# Patient Record
Sex: MALE | Race: ASIAN | NOT HISPANIC OR LATINO | Employment: OTHER
[De-identification: names, ages, dates, MRNs, and addresses within clinical notes are randomized per-mention and may not be internally consistent; named-entity substitution may affect disease eponyms.]

---

## 2017-02-22 ENCOUNTER — CHARTING TRANS (OUTPATIENT)
Dept: OTHER | Age: 67
End: 2017-02-22

## 2017-02-22 ENCOUNTER — LAB SERVICES (OUTPATIENT)
Dept: OTHER | Age: 67
End: 2017-02-22

## 2017-02-28 LAB
25(OH)D3+25(OH)D2 SERPL-MCNC: 38.1 NG/ML (ref 30–100)
ALBUMIN SERPL-MCNC: 3.7 G/DL (ref 3.6–5.1)
ALBUMIN/GLOB SERPL: 1.1 (ref 1–2.4)
ALP SERPL-CCNC: 85 UNITS/L (ref 45–117)
ALT SERPL-CCNC: 20 UNITS/L
ANA SER QL IA: NEGATIVE
ANION GAP SERPL CALC-SCNC: 15 MMOL/L (ref 10–20)
AST SERPL-CCNC: 13 UNITS/L
BASOPHILS # BLD: 0 K/MCL (ref 0–0.3)
BASOPHILS NFR BLD: 0 %
BILIRUB SERPL-MCNC: 0.9 MG/DL (ref 0.2–1)
BUN SERPL-MCNC: 19 MG/DL (ref 6–20)
BUN/CREAT SERPL: 17 (ref 7–25)
CALCIUM SERPL-MCNC: 9 MG/DL (ref 8.4–10.2)
CHLORIDE SERPL-SCNC: 106 MMOL/L (ref 98–107)
CHOLEST SERPL-MCNC: 176 MG/DL
CHOLEST/HDLC SERPL: 2.7
CO2 SERPL-SCNC: 25 MMOL/L (ref 21–32)
CREAT SERPL-MCNC: 1.15 MG/DL (ref 0.67–1.17)
CRP SERPL HS-MCNC: 2.91 MG/L
DIFFERENTIAL METHOD BLD: ABNORMAL
EOSINOPHIL # BLD: 0.2 K/MCL (ref 0.1–0.5)
EOSINOPHIL NFR BLD: 3 %
ERYTHROCYTE [DISTWIDTH] IN BLOOD: 14 % (ref 11–15)
ERYTHROCYTE [SEDIMENTATION RATE] IN BLOOD BY WESTERGREN METHOD: 10 MM/HR (ref 0–15)
FOLATE SERPL-MCNC: 11 NG/ML
GLOBULIN SER-MCNC: 3.4 G/DL (ref 2–4)
GLUCOSE SERPL-MCNC: 90 MG/DL (ref 65–99)
HBA1C MFR BLD: 5.9 % (ref 4.5–5.6)
HDLC SERPL-MCNC: 65 MG/DL
HEMATOCRIT: 40.1 % (ref 39–51)
HEMOGLOBIN: 12.9 G/DL (ref 13–17)
IRON SATN MFR SERPL: 21 % (ref 15–45)
IRON SERPL-MCNC: 72 MCG/DL (ref 65–175)
LDLC SERPL CALC-MCNC: 88 MG/DL
LENGTH OF FAST TIME PATIENT: ABNORMAL HRS
LENGTH OF FAST TIME PATIENT: ABNORMAL HRS
LYMPHOCYTES # BLD: 2.7 K/MCL (ref 1–4)
LYMPHOCYTES NFR BLD: 37 %
MEAN CORPUSCULAR HEMOGLOBIN: 24.6 PG (ref 26–34)
MEAN CORPUSCULAR HGB CONC: 32.2 G/DL (ref 32–36.5)
MEAN CORPUSCULAR VOLUME: 76.4 FL (ref 78–100)
MONOCYTES # BLD: 0.7 K/MCL (ref 0.3–0.9)
MONOCYTES NFR BLD: 10 %
NEUTROPHILS # BLD: 3.6 K/MCL (ref 1.8–7.7)
NEUTROPHILS NFR BLD: 50 %
NONHDLC SERPL-MCNC: 111 MG/DL
PLATELET COUNT: 181 K/MCL (ref 140–450)
POTASSIUM SERPL-SCNC: 4.1 MMOL/L (ref 3.4–5.1)
RED CELL COUNT: 5.25 MIL/MCL (ref 4.5–5.9)
RHEUMATOID FACT SER NEPH-ACNC: <10 UNITS/ML
SODIUM SERPL-SCNC: 142 MMOL/L (ref 135–145)
T3FREE SERPL-MCNC: 3.5 PG/ML (ref 2.2–4)
T4 FREE SERPL-MCNC: 1.2 NG/DL (ref 0.8–1.5)
TIBC SERPL-MCNC: 344 MCG/DL (ref 250–450)
TOTAL PROTEIN: 7.1 G/DL (ref 6.4–8.2)
TRIGL SERPL-MCNC: 116 MG/DL
TSH SERPL-ACNC: 1.29 MCUNITS/ML (ref 0.35–5)
VIT B12 SERPL-MCNC: 406 PG/ML (ref 211–911)
WHITE BLOOD COUNT: 7.3 K/MCL (ref 4.2–11)

## 2017-03-08 ENCOUNTER — CHARTING TRANS (OUTPATIENT)
Dept: OTHER | Age: 67
End: 2017-03-08

## 2017-04-25 ENCOUNTER — CHARTING TRANS (OUTPATIENT)
Dept: OTHER | Age: 67
End: 2017-04-25

## 2017-05-08 ENCOUNTER — HOSPITAL (OUTPATIENT)
Dept: OTHER | Age: 67
End: 2017-05-08
Attending: ORTHOPAEDIC SURGERY

## 2017-11-01 ENCOUNTER — LAB SERVICES (OUTPATIENT)
Dept: OTHER | Age: 67
End: 2017-11-01

## 2017-11-02 LAB
25(OH)D3+25(OH)D2 SERPL-MCNC: 35.1 NG/ML (ref 30–100)
BASOPHILS # BLD: 0 K/MCL (ref 0–0.3)
BASOPHILS NFR BLD: 1 %
CHOLEST SERPL-MCNC: 182 MG/DL
CHOLEST/HDLC SERPL: 3.4
CRP SERPL HS-MCNC: 2.63 MG/L
DIFFERENTIAL METHOD BLD: ABNORMAL
EOSINOPHIL # BLD: 0.2 K/MCL (ref 0.1–0.5)
EOSINOPHIL NFR BLD: 3 %
ERYTHROCYTE [DISTWIDTH] IN BLOOD: 13.3 % (ref 11–15)
HBA1C MFR BLD: 5.7 % (ref 4.5–5.6)
HDLC SERPL-MCNC: 54 MG/DL
HEMATOCRIT: 41.2 % (ref 39–51)
HEMOGLOBIN: 13 G/DL (ref 13–17)
IRON SATN MFR SERPL: 27 % (ref 15–45)
IRON SERPL-MCNC: 81 MCG/DL (ref 65–175)
LDLC SERPL CALC-MCNC: 102 MG/DL
LENGTH OF FAST TIME PATIENT: NORMAL HRS
LYMPHOCYTES # BLD: 1.9 K/MCL (ref 1–4)
LYMPHOCYTES NFR BLD: 36 %
MEAN CORPUSCULAR HEMOGLOBIN: 25.1 PG (ref 26–34)
MEAN CORPUSCULAR HGB CONC: 31.6 G/DL (ref 32–36.5)
MEAN CORPUSCULAR VOLUME: 79.7 FL (ref 78–100)
MONOCYTES # BLD: 0.6 K/MCL (ref 0.3–0.9)
MONOCYTES NFR BLD: 11 %
NEUTROPHILS # BLD: 2.7 K/MCL (ref 1.8–7.7)
NEUTROPHILS NFR BLD: 49 %
NONHDLC SERPL-MCNC: 128 MG/DL
PLATELET COUNT: 206 K/MCL (ref 140–450)
RED CELL COUNT: 5.17 MIL/MCL (ref 4.5–5.9)
TIBC SERPL-MCNC: 299 MCG/DL (ref 250–450)
TRIGL SERPL-MCNC: 128 MG/DL
TSH SERPL-ACNC: 1.33 MCUNITS/ML (ref 0.35–5)
VIT B12 SERPL-MCNC: 308 PG/ML (ref 211–911)
WHITE BLOOD COUNT: 5.4 K/MCL (ref 4.2–11)

## 2017-11-04 LAB — MAGNESIUM RBC-SCNC: 4.6

## 2017-11-15 ENCOUNTER — CHARTING TRANS (OUTPATIENT)
Dept: OTHER | Age: 67
End: 2017-11-15

## 2017-12-26 ENCOUNTER — HOSPITAL (OUTPATIENT)
Dept: OTHER | Age: 67
End: 2017-12-26
Attending: INTERNAL MEDICINE

## 2018-02-26 ENCOUNTER — PRIOR ORIGINAL RECORDS (OUTPATIENT)
Dept: OTHER | Age: 68
End: 2018-02-26

## 2018-02-26 ENCOUNTER — LAB SERVICES (OUTPATIENT)
Dept: OTHER | Age: 68
End: 2018-02-26

## 2018-02-27 LAB
25(OH)D3+25(OH)D2 SERPL-MCNC: 45.9 NG/ML (ref 30–100)
BASOPHILS # BLD: 0 K/MCL (ref 0–0.3)
BASOPHILS NFR BLD: 0 %
CHOLEST SERPL-MCNC: 187 MG/DL
CHOLEST/HDLC SERPL: 3.2
DIFFERENTIAL METHOD BLD: ABNORMAL
EOSINOPHIL # BLD: 0.2 K/MCL (ref 0.1–0.5)
EOSINOPHIL NFR BLD: 2 %
ERYTHROCYTE [DISTWIDTH] IN BLOOD: 14.1 % (ref 11–15)
ERYTHROCYTE [SEDIMENTATION RATE] IN BLOOD BY WESTERGREN METHOD: 10 MM/HR (ref 0–20)
FOLATE SERPL-MCNC: 7 NG/ML
HBA1C MFR BLD: 5.8 % (ref 4.5–5.6)
HDLC SERPL-MCNC: 58 MG/DL
HEMATOCRIT: 41.1 % (ref 39–51)
HEMOGLOBIN: 13.1 G/DL (ref 13–17)
LDLC SERPL CALC-MCNC: 106 MG/DL
LENGTH OF FAST TIME PATIENT: NORMAL HRS
LYMPHOCYTES # BLD: 2.3 K/MCL (ref 1–4)
LYMPHOCYTES NFR BLD: 33 %
MAGNESIUM RBC-SCNC: NORMAL
MEAN CORPUSCULAR HEMOGLOBIN: 24.8 PG (ref 26–34)
MEAN CORPUSCULAR HGB CONC: 31.9 G/DL (ref 32–36.5)
MEAN CORPUSCULAR VOLUME: 77.7 FL (ref 78–100)
MONOCYTES # BLD: 0.6 K/MCL (ref 0.3–0.9)
MONOCYTES NFR BLD: 9 %
NEUTROPHILS # BLD: 3.9 K/MCL (ref 1.8–7.7)
NEUTROPHILS NFR BLD: 56 %
NONHDLC SERPL-MCNC: 129 MG/DL
PLATELET COUNT: 199 K/MCL (ref 140–450)
RED CELL COUNT: 5.29 MIL/MCL (ref 4.5–5.9)
TRIGL SERPL-MCNC: 116 MG/DL
VIT B12 SERPL-MCNC: 379 PG/ML (ref 211–911)
WHITE BLOOD COUNT: 7 K/MCL (ref 4.2–11)

## 2018-02-28 LAB
CREATININE RANDOM URINE: 54 MG/DL
MICROALBUMIN UR-MCNC: <0.5 MG/DL
MICROALBUMIN/CREAT UR: NORMAL MCG/MG

## 2018-03-15 ENCOUNTER — CHARTING TRANS (OUTPATIENT)
Dept: OTHER | Age: 68
End: 2018-03-15

## 2018-03-17 LAB
Lab: NORMAL
REF LAB ORDER CODE: 623
RESULT: 4.1
TEST NAME: NORMAL

## 2018-03-30 ENCOUNTER — HOSPITAL (OUTPATIENT)
Dept: OTHER | Age: 68
End: 2018-03-30
Attending: FAMILY MEDICINE

## 2018-04-03 ENCOUNTER — HOSPITAL (OUTPATIENT)
Dept: OTHER | Age: 68
End: 2018-04-03
Attending: FAMILY MEDICINE

## 2018-05-02 ENCOUNTER — HOSPITAL (OUTPATIENT)
Dept: OTHER | Age: 68
End: 2018-05-02
Attending: FAMILY MEDICINE

## 2018-09-24 ENCOUNTER — CHARTING TRANS (OUTPATIENT)
Dept: OTHER | Age: 68
End: 2018-09-24

## 2018-09-24 ENCOUNTER — MYAURORA ACCOUNT LINK (OUTPATIENT)
Dept: OTHER | Age: 68
End: 2018-09-24

## 2018-09-27 ENCOUNTER — LAB SERVICES (OUTPATIENT)
Dept: OTHER | Age: 68
End: 2018-09-27

## 2018-10-02 LAB — HEMOCCULT STL QL: NEGATIVE

## 2018-10-11 ENCOUNTER — PRIOR ORIGINAL RECORDS (OUTPATIENT)
Dept: OTHER | Age: 68
End: 2018-10-11

## 2018-10-11 ENCOUNTER — MYAURORA ACCOUNT LINK (OUTPATIENT)
Dept: OTHER | Age: 68
End: 2018-10-11

## 2018-10-17 ENCOUNTER — CHARTING TRANS (OUTPATIENT)
Dept: OTHER | Age: 68
End: 2018-10-17

## 2018-10-17 LAB
CHOLESTEROL, TOTAL: 187 MG/DL
HDL CHOLESTEROL: 58 MG/DL
LDL CHOLESTEROL: 106 MG/DL
TRIGLYCERIDES: 116 MG/DL

## 2018-10-21 ENCOUNTER — MYAURORA ACCOUNT LINK (OUTPATIENT)
Dept: OTHER | Age: 68
End: 2018-10-21

## 2018-10-22 ENCOUNTER — PRIOR ORIGINAL RECORDS (OUTPATIENT)
Dept: OTHER | Age: 68
End: 2018-10-22

## 2018-11-01 VITALS
DIASTOLIC BLOOD PRESSURE: 80 MMHG | BODY MASS INDEX: 39.66 KG/M2 | WEIGHT: 246.8 LBS | TEMPERATURE: 97.8 F | HEART RATE: 62 BPM | RESPIRATION RATE: 18 BRPM | HEIGHT: 66 IN | SYSTOLIC BLOOD PRESSURE: 124 MMHG

## 2018-11-02 VITALS
DIASTOLIC BLOOD PRESSURE: 82 MMHG | BODY MASS INDEX: 39.7 KG/M2 | HEART RATE: 58 BPM | WEIGHT: 247 LBS | SYSTOLIC BLOOD PRESSURE: 128 MMHG | HEIGHT: 66 IN

## 2018-11-05 VITALS
HEIGHT: 66 IN | DIASTOLIC BLOOD PRESSURE: 80 MMHG | BODY MASS INDEX: 38.25 KG/M2 | SYSTOLIC BLOOD PRESSURE: 126 MMHG | WEIGHT: 238 LBS

## 2018-11-21 ENCOUNTER — PRIOR ORIGINAL RECORDS (OUTPATIENT)
Dept: OTHER | Age: 68
End: 2018-11-21

## 2018-11-27 VITALS
HEART RATE: 68 BPM | RESPIRATION RATE: 18 BRPM | BODY MASS INDEX: 39.89 KG/M2 | WEIGHT: 248.2 LBS | DIASTOLIC BLOOD PRESSURE: 80 MMHG | HEIGHT: 66 IN | SYSTOLIC BLOOD PRESSURE: 126 MMHG | TEMPERATURE: 98.2 F

## 2019-01-16 ENCOUNTER — TELEPHONE (OUTPATIENT)
Dept: INTERNAL MEDICINE | Age: 69
End: 2019-01-16

## 2019-01-16 DIAGNOSIS — E53.8 B12 DEFICIENCY: ICD-10-CM

## 2019-01-16 DIAGNOSIS — Z00.00 WELL ADULT EXAM: Primary | ICD-10-CM

## 2019-01-16 DIAGNOSIS — E61.2 MAGNESIUM DEFICIENCY: ICD-10-CM

## 2019-01-16 DIAGNOSIS — E78.00 ELEVATED LDL CHOLESTEROL LEVEL: ICD-10-CM

## 2019-01-16 DIAGNOSIS — R73.09 ELEVATED GLUCOSE: ICD-10-CM

## 2019-01-16 DIAGNOSIS — E55.9 VITAMIN D DEFICIENCY: ICD-10-CM

## 2019-02-14 RX ORDER — ERGOCALCIFEROL 1.25 MG/1
CAPSULE ORAL
Qty: 4 CAPSULE | Refills: 4 | Status: SHIPPED | OUTPATIENT
Start: 2019-02-14 | End: 2020-11-20 | Stop reason: ALTCHOICE

## 2019-02-28 VITALS
SYSTOLIC BLOOD PRESSURE: 122 MMHG | HEART RATE: 70 BPM | DIASTOLIC BLOOD PRESSURE: 70 MMHG | BODY MASS INDEX: 39.53 KG/M2 | WEIGHT: 246 LBS | HEIGHT: 66 IN | OXYGEN SATURATION: 97 %

## 2019-03-07 ENCOUNTER — TELEPHONE (OUTPATIENT)
Dept: FAMILY MEDICINE | Age: 69
End: 2019-03-07

## 2019-03-08 RX ORDER — PAROXETINE 10 MG/1
TABLET, FILM COATED ORAL
Qty: 30 TABLET | Refills: 1 | Status: SHIPPED | OUTPATIENT
Start: 2019-03-08 | End: 2020-10-19 | Stop reason: CLARIF

## 2019-03-11 RX ORDER — PAROXETINE 10 MG/1
TABLET, FILM COATED ORAL
Qty: 90 TABLET | Refills: 0 | OUTPATIENT
Start: 2019-03-11

## 2019-03-12 ENCOUNTER — TELEPHONE (OUTPATIENT)
Dept: FAMILY MEDICINE | Age: 69
End: 2019-03-12

## 2019-03-13 RX ORDER — PAROXETINE 10 MG/1
10 TABLET, FILM COATED ORAL EVERY MORNING
Qty: 30 TABLET | Refills: 3 | Status: SHIPPED | OUTPATIENT
Start: 2019-03-13 | End: 2020-10-19 | Stop reason: CLARIF

## 2019-04-15 ENCOUNTER — TELEPHONE (OUTPATIENT)
Dept: FAMILY MEDICINE | Age: 69
End: 2019-04-15

## 2019-04-18 RX ORDER — PAROXETINE 10 MG/1
10 TABLET, FILM COATED ORAL EVERY MORNING
Qty: 30 TABLET | Refills: 3 | Status: SHIPPED | OUTPATIENT
Start: 2019-04-18 | End: 2020-10-19 | Stop reason: CLARIF

## 2020-10-15 ENCOUNTER — TELEPHONE (OUTPATIENT)
Dept: SCHEDULING | Age: 70
End: 2020-10-15

## 2020-10-19 ENCOUNTER — OFFICE VISIT (OUTPATIENT)
Dept: FAMILY MEDICINE | Age: 70
End: 2020-10-19

## 2020-10-19 VITALS
HEART RATE: 68 BPM | BODY MASS INDEX: 41.25 KG/M2 | DIASTOLIC BLOOD PRESSURE: 69 MMHG | SYSTOLIC BLOOD PRESSURE: 113 MMHG | WEIGHT: 255.6 LBS

## 2020-10-19 DIAGNOSIS — Z00.00 HEALTHCARE MAINTENANCE: Primary | ICD-10-CM

## 2020-10-19 DIAGNOSIS — R73.03 PREDIABETES: ICD-10-CM

## 2020-10-19 DIAGNOSIS — M19.90 ARTHRITIS: ICD-10-CM

## 2020-10-19 DIAGNOSIS — F41.9 ANXIETY: ICD-10-CM

## 2020-10-19 PROBLEM — E11.9 DIABETES MELLITUS (CMD): Status: ACTIVE | Noted: 2017-03-08

## 2020-10-19 PROBLEM — E11.9 DIABETES MELLITUS (CMD): Status: RESOLVED | Noted: 2017-03-08 | Resolved: 2020-10-19

## 2020-10-19 PROCEDURE — 90662 IIV NO PRSV INCREASED AG IM: CPT | Performed by: FAMILY MEDICINE

## 2020-10-19 PROCEDURE — 90732 PPSV23 VACC 2 YRS+ SUBQ/IM: CPT | Performed by: FAMILY MEDICINE

## 2020-10-19 PROCEDURE — 99397 PER PM REEVAL EST PAT 65+ YR: CPT | Performed by: FAMILY MEDICINE

## 2020-10-19 RX ORDER — ESCITALOPRAM OXALATE 10 MG/1
TABLET ORAL
COMMUNITY
End: 2020-10-19 | Stop reason: ALTCHOICE

## 2020-10-19 RX ORDER — NABUMETONE 750 MG/1
750 TABLET, FILM COATED ORAL
COMMUNITY
Start: 2018-10-11 | End: 2020-10-19 | Stop reason: SDUPTHER

## 2020-10-19 RX ORDER — ESCITALOPRAM OXALATE 20 MG/1
20 TABLET ORAL DAILY
Qty: 90 TABLET | Refills: 3 | Status: SHIPPED | OUTPATIENT
Start: 2020-10-19 | End: 2022-11-04 | Stop reason: SDUPTHER

## 2020-10-19 RX ORDER — NABUMETONE 750 MG/1
750 TABLET, FILM COATED ORAL 2 TIMES DAILY
Qty: 180 TABLET | Refills: 3 | Status: SHIPPED | OUTPATIENT
Start: 2020-10-19 | End: 2021-11-29 | Stop reason: SDUPTHER

## 2020-10-19 ASSESSMENT — ENCOUNTER SYMPTOMS
UNEXPECTED WEIGHT CHANGE: 0
BACK PAIN: 1
APPETITE CHANGE: 0
AGITATION: 0
FATIGUE: 0
ABDOMINAL DISTENTION: 0
FEVER: 0
ACTIVITY CHANGE: 0
DIZZINESS: 0
CHILLS: 0
SHORTNESS OF BREATH: 0
RHINORRHEA: 0
CONSTIPATION: 0
NERVOUS/ANXIOUS: 0
COLOR CHANGE: 0
VOMITING: 0
LIGHT-HEADEDNESS: 0
NUMBNESS: 0
HEADACHES: 0
DIARRHEA: 0
NAUSEA: 0
WEAKNESS: 0
ABDOMINAL PAIN: 0

## 2020-10-19 ASSESSMENT — PATIENT HEALTH QUESTIONNAIRE - PHQ9
CLINICAL INTERPRETATION OF PHQ2 SCORE: NO FURTHER SCREENING NEEDED
SUM OF ALL RESPONSES TO PHQ9 QUESTIONS 1 AND 2: 0
SUM OF ALL RESPONSES TO PHQ9 QUESTIONS 1 AND 2: 0
2. FEELING DOWN, DEPRESSED OR HOPELESS: NOT AT ALL
CLINICAL INTERPRETATION OF PHQ9 SCORE: NO FURTHER SCREENING NEEDED
1. LITTLE INTEREST OR PLEASURE IN DOING THINGS: NOT AT ALL

## 2020-10-29 ENCOUNTER — LAB SERVICES (OUTPATIENT)
Dept: LAB | Age: 70
End: 2020-10-29

## 2020-10-29 DIAGNOSIS — Z00.00 HEALTHCARE MAINTENANCE: ICD-10-CM

## 2020-10-29 LAB
ALBUMIN SERPL-MCNC: 3.7 G/DL (ref 3.6–5.1)
ALBUMIN/GLOB SERPL: 0.9 {RATIO} (ref 1–2.4)
ALP SERPL-CCNC: 85 UNITS/L (ref 45–117)
ALT SERPL-CCNC: 21 UNITS/L
ANION GAP SERPL CALC-SCNC: 9 MMOL/L (ref 10–20)
AST SERPL-CCNC: 19 UNITS/L
BILIRUB SERPL-MCNC: 0.9 MG/DL (ref 0.2–1)
BUN SERPL-MCNC: 23 MG/DL (ref 6–20)
BUN/CREAT SERPL: 19 (ref 7–25)
CALCIUM SERPL-MCNC: 9.2 MG/DL (ref 8.4–10.2)
CHLORIDE SERPL-SCNC: 107 MMOL/L (ref 98–107)
CHOLEST SERPL-MCNC: 195 MG/DL
CHOLEST/HDLC SERPL: 3.3 {RATIO}
CO2 SERPL-SCNC: 27 MMOL/L (ref 21–32)
CREAT SERPL-MCNC: 1.23 MG/DL (ref 0.67–1.17)
GLOBULIN SER-MCNC: 4.1 G/DL (ref 2–4)
GLUCOSE SERPL-MCNC: 104 MG/DL (ref 65–99)
HBA1C MFR BLD: 6 % (ref 4.5–5.6)
HDLC SERPL-MCNC: 59 MG/DL
LDLC SERPL CALC-MCNC: 116 MG/DL
LENGTH OF FAST TIME PATIENT: 12 HRS
LENGTH OF FAST TIME PATIENT: 12 HRS
NONHDLC SERPL-MCNC: 136 MG/DL
POTASSIUM SERPL-SCNC: 4.3 MMOL/L (ref 3.4–5.1)
PROT SERPL-MCNC: 7.8 G/DL (ref 6.4–8.2)
SODIUM SERPL-SCNC: 139 MMOL/L (ref 135–145)
TRIGL SERPL-MCNC: 99 MG/DL

## 2020-10-29 PROCEDURE — 80053 COMPREHEN METABOLIC PANEL: CPT | Performed by: FAMILY MEDICINE

## 2020-10-29 PROCEDURE — 83036 HEMOGLOBIN GLYCOSYLATED A1C: CPT | Performed by: FAMILY MEDICINE

## 2020-10-29 PROCEDURE — 80061 LIPID PANEL: CPT | Performed by: FAMILY MEDICINE

## 2020-10-29 PROCEDURE — 36415 COLL VENOUS BLD VENIPUNCTURE: CPT | Performed by: FAMILY MEDICINE

## 2020-10-29 PROCEDURE — 85025 COMPLETE CBC W/AUTO DIFF WBC: CPT | Performed by: FAMILY MEDICINE

## 2020-10-30 LAB
BASOPHILS # BLD: 0.1 K/MCL (ref 0–0.3)
BASOPHILS NFR BLD: 1 %
DIFFERENTIAL METHOD BLD: ABNORMAL
EOSINOPHIL # BLD: 0.2 K/MCL (ref 0.1–0.5)
EOSINOPHIL NFR BLD: 2 %
ERYTHROCYTE [DISTWIDTH] IN BLOOD: 14 % (ref 11–15)
HCT VFR BLD CALC: 43.6 % (ref 39–51)
HGB BLD-MCNC: 13 G/DL (ref 13–17)
IMM GRANULOCYTES # BLD AUTO: 0 K/MCL (ref 0–0.2)
IMM GRANULOCYTES NFR BLD: 0 %
LYMPHOCYTES # BLD: 2.8 K/MCL (ref 1–4)
LYMPHOCYTES NFR BLD: 37 %
MCH RBC QN AUTO: 23.7 PG (ref 26–34)
MCHC RBC AUTO-ENTMCNC: 29.8 G/DL (ref 32–36.5)
MCV RBC AUTO: 79.4 FL (ref 78–100)
MONOCYTES # BLD: 0.7 K/MCL (ref 0.3–0.9)
MONOCYTES NFR BLD: 9 %
NEUTROPHILS # BLD: 3.8 K/MCL (ref 1.8–7.7)
NEUTROPHILS NFR BLD: 51 %
NRBC BLD MANUAL-RTO: 0 /100 WBC
PLATELET # BLD: 228 K/MCL (ref 140–450)
RBC # BLD: 5.49 MIL/MCL (ref 4.5–5.9)
WBC # BLD: 7.6 K/MCL (ref 4.2–11)

## 2020-11-10 ENCOUNTER — E-ADVICE (OUTPATIENT)
Dept: FAMILY MEDICINE | Age: 70
End: 2020-11-10

## 2020-11-10 ENCOUNTER — TELEPHONE (OUTPATIENT)
Dept: SCHEDULING | Age: 70
End: 2020-11-10

## 2020-11-19 ENCOUNTER — V-VISIT (OUTPATIENT)
Dept: FAMILY MEDICINE | Age: 70
End: 2020-11-19

## 2020-11-19 DIAGNOSIS — K14.0 TONGUE ULCER: ICD-10-CM

## 2020-11-19 DIAGNOSIS — E78.5 HYPERLIPIDEMIA, UNSPECIFIED HYPERLIPIDEMIA TYPE: ICD-10-CM

## 2020-11-19 DIAGNOSIS — M19.90 ARTHRITIS: ICD-10-CM

## 2020-11-19 DIAGNOSIS — F41.9 ANXIETY: Primary | ICD-10-CM

## 2020-11-19 PROCEDURE — 96127 BRIEF EMOTIONAL/BEHAV ASSMT: CPT | Performed by: FAMILY MEDICINE

## 2020-11-19 PROCEDURE — 99213 OFFICE O/P EST LOW 20 MIN: CPT | Performed by: FAMILY MEDICINE

## 2020-11-19 RX ORDER — DEXAMETHASONE 0.5 MG/5ML
0.5 ELIXIR ORAL
Qty: 237 ML | Refills: 0 | Status: SHIPPED | OUTPATIENT
Start: 2020-11-19 | End: 2021-05-20 | Stop reason: ALTCHOICE

## 2020-11-19 RX ORDER — ATORVASTATIN CALCIUM 40 MG/1
40 TABLET, FILM COATED ORAL DAILY
Qty: 90 TABLET | Refills: 3 | Status: SHIPPED | OUTPATIENT
Start: 2020-11-19 | End: 2022-11-04 | Stop reason: SDUPTHER

## 2020-11-19 RX ORDER — LIDOCAINE 50 MG/G
OINTMENT TOPICAL PRN
Qty: 50 G | Refills: 0 | Status: SHIPPED | OUTPATIENT
Start: 2020-11-19 | End: 2021-11-29 | Stop reason: SDUPTHER

## 2020-11-19 SDOH — HEALTH STABILITY: MENTAL HEALTH: HOW OFTEN DO YOU HAVE A DRINK CONTAINING ALCOHOL?: NEVER

## 2020-11-19 ASSESSMENT — ANXIETY QUESTIONNAIRES
IF YOU CHECKED OFF ANY PROBLEMS ON THIS QUESTIONNAIRE, HOW DIFFICULT HAVE THESE PROBLEMS MADE IT FOR YOU TO DO YOUR WORK, TAKE CARE OF THINGS AT HOME, OR GET ALONG WITH OTHER PEOPLE: NOT DIFFICULT AT ALL
6. BECOMING EASILY ANNOYED OR IRRITABLE: 1
GAD7 TOTAL SCORE: 2
7. FEELING AFRAID AS IF SOMETHING AWFUL MIGHT HAPPEN: 0
2. NOT BEING ABLE TO STOP OR CONTROL WORRYING: SEVERAL DAYS
4. TROUBLE RELAXING: NOT AT ALL
6. BECOMING EASILY ANNOYED OR IRRITABLE: SEVERAL DAYS
3. WORRYING TOO MUCH ABOUT DIFFERENT THINGS: NOT AT ALL
7. FEELING AFRAID AS IF SOMETHING AWFUL MIGHT HAPPEN: NOT AT ALL
1. FEELING NERVOUS, ANXIOUS, OR ON EDGE: 0
5. BEING SO RESTLESS THAT IT IS HARD TO SIT STILL: NOT AT ALL
3. WORRYING TOO MUCH ABOUT DIFFERENT THINGS: 0
2. NOT BEING ABLE TO STOP OR CONTROL WORRYING: 1
4. TROUBLE RELAXING: 0
1. FEELING NERVOUS, ANXIOUS, OR ON EDGE: NOT AT ALL
5. BEING SO RESTLESS THAT IT IS HARD TO SIT STILL: 0

## 2020-11-19 ASSESSMENT — PATIENT HEALTH QUESTIONNAIRE - PHQ9
SUM OF ALL RESPONSES TO PHQ9 QUESTIONS 1 AND 2: 0
CLINICAL INTERPRETATION OF PHQ2 SCORE: NO FURTHER SCREENING NEEDED
1. LITTLE INTEREST OR PLEASURE IN DOING THINGS: NOT AT ALL
SUM OF ALL RESPONSES TO PHQ9 QUESTIONS 1 AND 2: 0
CLINICAL INTERPRETATION OF PHQ9 SCORE: NO FURTHER SCREENING NEEDED
2. FEELING DOWN, DEPRESSED OR HOPELESS: NOT AT ALL

## 2020-11-20 PROBLEM — K14.0 TONGUE ULCER: Status: ACTIVE | Noted: 2020-11-20

## 2020-11-20 PROBLEM — E78.5 HYPERLIPIDEMIA: Status: ACTIVE | Noted: 2020-11-20

## 2020-11-20 ASSESSMENT — ENCOUNTER SYMPTOMS
DIZZINESS: 0
AGITATION: 0
COLOR CHANGE: 0
NERVOUS/ANXIOUS: 0
ABDOMINAL DISTENTION: 0
CONSTIPATION: 0
APPETITE CHANGE: 0
LIGHT-HEADEDNESS: 0
NAUSEA: 0
FATIGUE: 0
ACTIVITY CHANGE: 0
HEADACHES: 0
VOMITING: 0
SHORTNESS OF BREATH: 0
DIARRHEA: 0
UNEXPECTED WEIGHT CHANGE: 0
FEVER: 0
ABDOMINAL PAIN: 0
RHINORRHEA: 0
CHILLS: 0

## 2021-01-11 ENCOUNTER — E-ADVICE (OUTPATIENT)
Dept: FAMILY MEDICINE | Age: 71
End: 2021-01-11

## 2021-01-11 ENCOUNTER — APPOINTMENT (OUTPATIENT)
Dept: FAMILY MEDICINE | Age: 71
End: 2021-01-11

## 2021-02-16 ENCOUNTER — LAB SERVICES (OUTPATIENT)
Dept: LAB | Age: 71
End: 2021-02-16

## 2021-02-16 ENCOUNTER — OFFICE VISIT (OUTPATIENT)
Dept: FAMILY MEDICINE | Age: 71
End: 2021-02-16

## 2021-02-16 DIAGNOSIS — R73.03 PREDIABETES: ICD-10-CM

## 2021-02-16 DIAGNOSIS — M25.561 CHRONIC PAIN OF BOTH KNEES: ICD-10-CM

## 2021-02-16 DIAGNOSIS — M48.061 SPINAL STENOSIS OF LUMBAR REGION AT MULTIPLE LEVELS: ICD-10-CM

## 2021-02-16 DIAGNOSIS — R35.0 BENIGN PROSTATIC HYPERPLASIA WITH URINARY FREQUENCY: ICD-10-CM

## 2021-02-16 DIAGNOSIS — E66.01 MORBID OBESITY WITH BMI OF 40.0-44.9, ADULT (CMD): ICD-10-CM

## 2021-02-16 DIAGNOSIS — G89.29 CHRONIC PAIN OF BOTH KNEES: ICD-10-CM

## 2021-02-16 DIAGNOSIS — E78.5 HYPERLIPIDEMIA, UNSPECIFIED HYPERLIPIDEMIA TYPE: ICD-10-CM

## 2021-02-16 DIAGNOSIS — M19.90 ARTHRITIS: ICD-10-CM

## 2021-02-16 DIAGNOSIS — M25.562 CHRONIC PAIN OF BOTH KNEES: ICD-10-CM

## 2021-02-16 DIAGNOSIS — R73.03 PREDIABETES: Primary | ICD-10-CM

## 2021-02-16 DIAGNOSIS — N40.1 BENIGN PROSTATIC HYPERPLASIA WITH URINARY FREQUENCY: ICD-10-CM

## 2021-02-16 LAB
ALBUMIN SERPL-MCNC: 3.5 G/DL (ref 3.6–5.1)
ALBUMIN/GLOB SERPL: 0.9 {RATIO} (ref 1–2.4)
ALP SERPL-CCNC: 88 UNITS/L (ref 45–117)
ALT SERPL-CCNC: 26 UNITS/L
ANION GAP SERPL CALC-SCNC: 9 MMOL/L (ref 10–20)
AST SERPL-CCNC: 19 UNITS/L
BILIRUB SERPL-MCNC: 0.7 MG/DL (ref 0.2–1)
BUN SERPL-MCNC: 19 MG/DL (ref 6–20)
BUN/CREAT SERPL: 17 (ref 7–25)
CALCIUM SERPL-MCNC: 9.3 MG/DL (ref 8.4–10.2)
CHLORIDE SERPL-SCNC: 106 MMOL/L (ref 98–107)
CHOLEST SERPL-MCNC: 158 MG/DL
CHOLEST/HDLC SERPL: 2.3 {RATIO}
CO2 SERPL-SCNC: 28 MMOL/L (ref 21–32)
CREAT SERPL-MCNC: 1.15 MG/DL (ref 0.67–1.17)
FASTING DURATION TIME PATIENT: ABNORMAL H
FASTING DURATION TIME PATIENT: NORMAL H
GFR SERPLBLD BASED ON 1.73 SQ M-ARVRAT: 65 ML/MIN/1.73M2
GLOBULIN SER-MCNC: 4 G/DL (ref 2–4)
GLUCOSE SERPL-MCNC: 92 MG/DL (ref 65–99)
HBA1C MFR BLD: 5.9 % (ref 4.5–5.6)
HDLC SERPL-MCNC: 68 MG/DL
LDLC SERPL CALC-MCNC: 78 MG/DL
NONHDLC SERPL-MCNC: 90 MG/DL
POTASSIUM SERPL-SCNC: 4.4 MMOL/L (ref 3.4–5.1)
PROT SERPL-MCNC: 7.5 G/DL (ref 6.4–8.2)
SODIUM SERPL-SCNC: 139 MMOL/L (ref 135–145)
TRIGL SERPL-MCNC: 60 MG/DL

## 2021-02-16 PROCEDURE — 99214 OFFICE O/P EST MOD 30 MIN: CPT | Performed by: FAMILY MEDICINE

## 2021-02-16 PROCEDURE — 36415 COLL VENOUS BLD VENIPUNCTURE: CPT | Performed by: FAMILY MEDICINE

## 2021-02-16 PROCEDURE — 80061 LIPID PANEL: CPT | Performed by: FAMILY MEDICINE

## 2021-02-16 PROCEDURE — 80053 COMPREHEN METABOLIC PANEL: CPT | Performed by: FAMILY MEDICINE

## 2021-02-16 PROCEDURE — 83036 HEMOGLOBIN GLYCOSYLATED A1C: CPT | Performed by: FAMILY MEDICINE

## 2021-02-16 RX ORDER — LANOLIN ALCOHOL/MO/W.PET/CERES
400 CREAM (GRAM) TOPICAL DAILY
COMMUNITY
End: 2023-05-09 | Stop reason: ALTCHOICE

## 2021-02-16 SDOH — HEALTH STABILITY: MENTAL HEALTH: HOW OFTEN DO YOU HAVE A DRINK CONTAINING ALCOHOL?: NEVER

## 2021-02-16 ASSESSMENT — PATIENT HEALTH QUESTIONNAIRE - PHQ9
2. FEELING DOWN, DEPRESSED OR HOPELESS: NOT AT ALL
SUM OF ALL RESPONSES TO PHQ9 QUESTIONS 1 AND 2: 0
1. LITTLE INTEREST OR PLEASURE IN DOING THINGS: NOT AT ALL
CLINICAL INTERPRETATION OF PHQ9 SCORE: NO FURTHER SCREENING NEEDED
CLINICAL INTERPRETATION OF PHQ2 SCORE: NO FURTHER SCREENING NEEDED
SUM OF ALL RESPONSES TO PHQ9 QUESTIONS 1 AND 2: 0

## 2021-02-16 ASSESSMENT — PAIN SCALES - GENERAL: PAINLEVEL: 0

## 2021-03-15 ENCOUNTER — HOSPITAL ENCOUNTER (OUTPATIENT)
Dept: PHYSICAL MEDICINE AND REHAB | Age: 71
Discharge: STILL ADMITTED OR ADMINISTRATIVE | End: 2021-03-15
Attending: FAMILY MEDICINE

## 2021-03-15 PROCEDURE — 97162 PT EVAL MOD COMPLEX 30 MIN: CPT | Performed by: PHYSICAL THERAPIST

## 2021-03-15 ASSESSMENT — ENCOUNTER SYMPTOMS
QUALITY: SHARP
PAIN SEVERITY NOW: 4
QUALITY: ACHE

## 2021-03-15 ASSESSMENT — MOVEMENT AND STRENGTH ASSESSMENTS
ANY OF YOUR USUAL WORK, HOUSEWORK OR SCHOOL ACTIVITIES: QUITE A BIT OF DIFFICULTY
GETTING INTO OR OUT OF A CAR: MODERATE DIFFICULTY
WALKING 2 BLOCKS: MODERATE DIFFICULTY
TOTAL SCORE: 35
WALKING A MILE: MODERATE DIFFICULTY
STANDING FOR 1 HOUR: EXTREME DIFFICULTY OR UNABLE TO PERFORM ACTIVITY
GOING UP OR DOWN 10 STAIRS (ABOUT 1 FLIGHT OF STAIRS): QUITE A BIT OF DIFFICULTY
MAKING SHARP TURNS WHILE RUNNING FAST: EXTREME DIFFICULTY OR UNABLE TO PERFORM ACTIVITY
ROLLING OVER IN BED: A LITTLE BIT OF DIFFICULTY
RUNNING ON UNEVEN GROUND: EXTREME DIFFICULTY OR UNABLE TO PERFORM ACTIVITY
YOUR USUAL HOBBIES, RECREATIONAL OR SPORTING ACTIVIITIES: MODERATE DIFFICULTY
RUNNING ON EVEN GROUND: EXTREME DIFFICULTY OR UNABLE TO PERFORM ACTIVITY
GETTING INTO OR OUT OF THE BATH: MODERATE DIFFICULTY
SQUATTING: EXTREME DIFFICULTY OR UNABLE TO PERFORM ACTIVITY
PERFORMING LIGHT ACTIVITES AROUND YOUR HOME: MODERATE DIFFICULTY
LIFTING AN OBJECT, LIKE A BAG OF GROCERIES, FROM THE FLOOR: MODERATE DIFFICULTY
PUTTING ON YOUR SHOES OR SOCKS: MODERATE DIFFICULTY
HOPPING: EXTREME DIFFICULTY OR UNABLE TO PERFORM ACTIVITY
SITTING FOR 1 HOUR: A LITTLE BIT OF DIFFICULTY
PERFORMING HEAVY ACTIVITIES AROUND YOUR HOME: QUITE A BIT OF DIFFICULTY
WALKING BETWEEN ROOMS: A LITTLE BIT OF DIFFICULTY

## 2021-03-22 ENCOUNTER — HOSPITAL ENCOUNTER (OUTPATIENT)
Dept: PHYSICAL MEDICINE AND REHAB | Age: 71
Discharge: STILL ADMITTED OR ADMINISTRATIVE | End: 2021-03-22
Attending: FAMILY MEDICINE

## 2021-03-22 PROCEDURE — 97140 MANUAL THERAPY 1/> REGIONS: CPT | Performed by: PHYSICAL THERAPIST

## 2021-03-22 PROCEDURE — 97110 THERAPEUTIC EXERCISES: CPT | Performed by: PHYSICAL THERAPIST

## 2021-03-29 ENCOUNTER — HOSPITAL ENCOUNTER (OUTPATIENT)
Dept: PHYSICAL MEDICINE AND REHAB | Age: 71
Discharge: STILL ADMITTED OR ADMINISTRATIVE | End: 2021-03-29
Attending: FAMILY MEDICINE

## 2021-03-29 PROCEDURE — 97110 THERAPEUTIC EXERCISES: CPT | Performed by: PHYSICAL THERAPIST

## 2021-03-29 PROCEDURE — 97140 MANUAL THERAPY 1/> REGIONS: CPT | Performed by: PHYSICAL THERAPIST

## 2021-04-05 ENCOUNTER — HOSPITAL ENCOUNTER (OUTPATIENT)
Dept: PHYSICAL MEDICINE AND REHAB | Age: 71
Discharge: STILL ADMITTED OR ADMINISTRATIVE | End: 2021-04-05
Attending: FAMILY MEDICINE

## 2021-04-05 PROCEDURE — 97140 MANUAL THERAPY 1/> REGIONS: CPT | Performed by: PHYSICAL THERAPIST

## 2021-04-05 PROCEDURE — 97110 THERAPEUTIC EXERCISES: CPT | Performed by: PHYSICAL THERAPIST

## 2021-04-14 ENCOUNTER — HOSPITAL ENCOUNTER (OUTPATIENT)
Dept: PHYSICAL MEDICINE AND REHAB | Age: 71
Discharge: STILL ADMITTED OR ADMINISTRATIVE | End: 2021-04-14
Attending: FAMILY MEDICINE

## 2021-04-14 PROCEDURE — 97140 MANUAL THERAPY 1/> REGIONS: CPT | Performed by: PHYSICAL THERAPIST

## 2021-04-14 PROCEDURE — 97110 THERAPEUTIC EXERCISES: CPT | Performed by: PHYSICAL THERAPIST

## 2021-04-19 ENCOUNTER — APPOINTMENT (OUTPATIENT)
Dept: PHYSICAL MEDICINE AND REHAB | Age: 71
End: 2021-04-19
Attending: FAMILY MEDICINE

## 2021-04-21 ENCOUNTER — HOSPITAL ENCOUNTER (OUTPATIENT)
Dept: PHYSICAL MEDICINE AND REHAB | Age: 71
Discharge: STILL ADMITTED OR ADMINISTRATIVE | End: 2021-04-21
Attending: FAMILY MEDICINE

## 2021-04-21 PROCEDURE — 97140 MANUAL THERAPY 1/> REGIONS: CPT | Performed by: PHYSICAL THERAPIST

## 2021-04-21 PROCEDURE — 97110 THERAPEUTIC EXERCISES: CPT | Performed by: PHYSICAL THERAPIST

## 2021-04-26 ENCOUNTER — APPOINTMENT (OUTPATIENT)
Dept: PHYSICAL MEDICINE AND REHAB | Age: 71
End: 2021-04-26
Attending: FAMILY MEDICINE

## 2021-04-28 ENCOUNTER — HOSPITAL ENCOUNTER (OUTPATIENT)
Dept: PHYSICAL MEDICINE AND REHAB | Age: 71
Discharge: STILL ADMITTED OR ADMINISTRATIVE | End: 2021-04-28
Attending: FAMILY MEDICINE

## 2021-04-28 PROCEDURE — 97140 MANUAL THERAPY 1/> REGIONS: CPT | Performed by: PHYSICAL THERAPIST

## 2021-04-28 PROCEDURE — 97110 THERAPEUTIC EXERCISES: CPT | Performed by: PHYSICAL THERAPIST

## 2021-05-03 ENCOUNTER — APPOINTMENT (OUTPATIENT)
Dept: PHYSICAL MEDICINE AND REHAB | Age: 71
End: 2021-05-03
Attending: FAMILY MEDICINE

## 2021-05-05 ENCOUNTER — HOSPITAL ENCOUNTER (OUTPATIENT)
Dept: PHYSICAL MEDICINE AND REHAB | Age: 71
Discharge: STILL ADMITTED OR ADMINISTRATIVE | End: 2021-05-05
Attending: FAMILY MEDICINE

## 2021-05-05 PROCEDURE — 97110 THERAPEUTIC EXERCISES: CPT | Performed by: PHYSICAL THERAPIST

## 2021-05-05 PROCEDURE — 97140 MANUAL THERAPY 1/> REGIONS: CPT | Performed by: PHYSICAL THERAPIST

## 2021-05-12 ENCOUNTER — HOSPITAL ENCOUNTER (OUTPATIENT)
Dept: PHYSICAL MEDICINE AND REHAB | Age: 71
Discharge: STILL ADMITTED OR ADMINISTRATIVE | End: 2021-05-12
Attending: FAMILY MEDICINE

## 2021-05-12 PROCEDURE — 97110 THERAPEUTIC EXERCISES: CPT | Performed by: PHYSICAL THERAPIST

## 2021-05-12 PROCEDURE — 97140 MANUAL THERAPY 1/> REGIONS: CPT | Performed by: PHYSICAL THERAPIST

## 2021-05-17 ENCOUNTER — OFFICE VISIT (OUTPATIENT)
Dept: FAMILY MEDICINE | Age: 71
End: 2021-05-17

## 2021-05-17 VITALS
HEART RATE: 54 BPM | BODY MASS INDEX: 41.14 KG/M2 | TEMPERATURE: 96.9 F | HEIGHT: 66 IN | WEIGHT: 256 LBS | DIASTOLIC BLOOD PRESSURE: 72 MMHG | SYSTOLIC BLOOD PRESSURE: 125 MMHG | OXYGEN SATURATION: 100 %

## 2021-05-17 DIAGNOSIS — E66.01 MORBID OBESITY WITH BMI OF 40.0-44.9, ADULT (CMD): ICD-10-CM

## 2021-05-17 DIAGNOSIS — B37.2 CANDIDAL INTERTRIGO: ICD-10-CM

## 2021-05-17 DIAGNOSIS — R73.03 PREDIABETES: Primary | ICD-10-CM

## 2021-05-17 DIAGNOSIS — E78.2 MIXED HYPERLIPIDEMIA: ICD-10-CM

## 2021-05-17 DIAGNOSIS — G47.33 OSA ON CPAP: ICD-10-CM

## 2021-05-17 DIAGNOSIS — N40.1 BENIGN PROSTATIC HYPERPLASIA WITH URINARY FREQUENCY: ICD-10-CM

## 2021-05-17 DIAGNOSIS — G89.29 CHRONIC PAIN OF BOTH KNEES: ICD-10-CM

## 2021-05-17 DIAGNOSIS — M25.561 CHRONIC PAIN OF BOTH KNEES: ICD-10-CM

## 2021-05-17 DIAGNOSIS — M25.562 CHRONIC PAIN OF BOTH KNEES: ICD-10-CM

## 2021-05-17 DIAGNOSIS — R35.0 BENIGN PROSTATIC HYPERPLASIA WITH URINARY FREQUENCY: ICD-10-CM

## 2021-05-17 PROCEDURE — 99214 OFFICE O/P EST MOD 30 MIN: CPT | Performed by: FAMILY MEDICINE

## 2021-05-17 RX ORDER — NYSTATIN 100000 U/G
CREAM TOPICAL 2 TIMES DAILY
Qty: 30 G | Refills: 1 | Status: SHIPPED | OUTPATIENT
Start: 2021-05-17 | End: 2021-07-27 | Stop reason: SDUPTHER

## 2021-05-18 ENCOUNTER — LAB SERVICES (OUTPATIENT)
Dept: LAB | Age: 71
End: 2021-05-18

## 2021-05-18 DIAGNOSIS — R73.03 PREDIABETES: ICD-10-CM

## 2021-05-18 LAB
ANION GAP SERPL CALC-SCNC: 11 MMOL/L (ref 10–20)
BUN SERPL-MCNC: 28 MG/DL (ref 6–20)
BUN/CREAT SERPL: 25 (ref 7–25)
CALCIUM SERPL-MCNC: 9 MG/DL (ref 8.4–10.2)
CHLORIDE SERPL-SCNC: 107 MMOL/L (ref 98–107)
CO2 SERPL-SCNC: 27 MMOL/L (ref 21–32)
CREAT SERPL-MCNC: 1.13 MG/DL (ref 0.67–1.17)
FASTING DURATION TIME PATIENT: 0 HOURS
GFR SERPLBLD BASED ON 1.73 SQ M-ARVRAT: 65 ML/MIN/1.73M2
GLUCOSE SERPL-MCNC: 86 MG/DL (ref 65–99)
HBA1C MFR BLD: 6 % (ref 4.5–5.6)
POTASSIUM SERPL-SCNC: 4.6 MMOL/L (ref 3.4–5.1)
SODIUM SERPL-SCNC: 140 MMOL/L (ref 135–145)

## 2021-05-18 PROCEDURE — 36415 COLL VENOUS BLD VENIPUNCTURE: CPT | Performed by: FAMILY MEDICINE

## 2021-05-18 PROCEDURE — 80048 BASIC METABOLIC PNL TOTAL CA: CPT | Performed by: FAMILY MEDICINE

## 2021-05-18 PROCEDURE — 83036 HEMOGLOBIN GLYCOSYLATED A1C: CPT | Performed by: FAMILY MEDICINE

## 2021-05-19 ENCOUNTER — OFFICE VISIT (OUTPATIENT)
Dept: SLEEP MEDICINE | Age: 71
End: 2021-05-19
Attending: NURSE PRACTITIONER

## 2021-05-19 ENCOUNTER — CLINICAL ABSTRACT (OUTPATIENT)
Dept: SLEEP MEDICINE | Age: 71
End: 2021-05-19

## 2021-05-19 ENCOUNTER — HOSPITAL ENCOUNTER (OUTPATIENT)
Dept: PHYSICAL MEDICINE AND REHAB | Age: 71
Discharge: STILL ADMITTED OR ADMINISTRATIVE | End: 2021-05-19
Attending: FAMILY MEDICINE

## 2021-05-19 VITALS
WEIGHT: 256 LBS | TEMPERATURE: 98.5 F | RESPIRATION RATE: 16 BRPM | DIASTOLIC BLOOD PRESSURE: 77 MMHG | SYSTOLIC BLOOD PRESSURE: 131 MMHG | BODY MASS INDEX: 41.14 KG/M2 | OXYGEN SATURATION: 99 % | HEIGHT: 66 IN | HEART RATE: 55 BPM

## 2021-05-19 DIAGNOSIS — G47.33 OSA (OBSTRUCTIVE SLEEP APNEA): Primary | ICD-10-CM

## 2021-05-19 DIAGNOSIS — G47.33 OSA ON CPAP: ICD-10-CM

## 2021-05-19 PROCEDURE — 99204 OFFICE O/P NEW MOD 45 MIN: CPT

## 2021-05-19 PROCEDURE — 97140 MANUAL THERAPY 1/> REGIONS: CPT | Performed by: PHYSICAL THERAPIST

## 2021-05-19 PROCEDURE — 97110 THERAPEUTIC EXERCISES: CPT | Performed by: PHYSICAL THERAPIST

## 2021-05-19 PROCEDURE — 99214 OFFICE O/P EST MOD 30 MIN: CPT | Performed by: NURSE PRACTITIONER

## 2021-05-19 ASSESSMENT — ENCOUNTER SYMPTOMS
ENDOCRINE NEGATIVE: 1
ALLERGIC/IMMUNOLOGIC NEGATIVE: 1
HEMATOLOGIC/LYMPHATIC NEGATIVE: 1
GASTROINTESTINAL NEGATIVE: 1
CONSTITUTIONAL NEGATIVE: 1
PSYCHIATRIC NEGATIVE: 1
EYES NEGATIVE: 1
PAIN SEVERITY NOW: 2
NEUROLOGICAL NEGATIVE: 1
RESPIRATORY NEGATIVE: 1

## 2021-05-20 PROBLEM — N40.1 BENIGN PROSTATIC HYPERPLASIA WITH URINARY FREQUENCY: Status: ACTIVE | Noted: 2021-05-20

## 2021-05-20 PROBLEM — R35.0 BENIGN PROSTATIC HYPERPLASIA WITH URINARY FREQUENCY: Status: ACTIVE | Noted: 2021-05-20

## 2021-05-20 PROBLEM — B37.2 CANDIDAL INTERTRIGO: Status: ACTIVE | Noted: 2021-05-20

## 2021-05-20 PROBLEM — E66.01 MORBID OBESITY WITH BMI OF 40.0-44.9, ADULT (CMD): Status: ACTIVE | Noted: 2021-05-20

## 2021-05-26 VITALS
BODY MASS INDEX: 41.65 KG/M2 | OXYGEN SATURATION: 97 % | WEIGHT: 250 LBS | SYSTOLIC BLOOD PRESSURE: 114 MMHG | HEIGHT: 65 IN | TEMPERATURE: 97.6 F | DIASTOLIC BLOOD PRESSURE: 62 MMHG | HEART RATE: 56 BPM

## 2021-06-17 ENCOUNTER — EXTERNAL RECORD (OUTPATIENT)
Dept: HEALTH INFORMATION MANAGEMENT | Facility: OTHER | Age: 71
End: 2021-06-17

## 2021-06-17 LAB — RETINOPATHY PRESENT (RTP): NO

## 2021-07-20 ENCOUNTER — TELEPHONE (OUTPATIENT)
Dept: SCHEDULING | Age: 71
End: 2021-07-20

## 2021-07-20 ENCOUNTER — APPOINTMENT (OUTPATIENT)
Dept: FAMILY MEDICINE | Age: 71
End: 2021-07-20

## 2021-07-27 ENCOUNTER — OFFICE VISIT (OUTPATIENT)
Dept: FAMILY MEDICINE | Age: 71
End: 2021-07-27

## 2021-07-27 VITALS
HEART RATE: 55 BPM | DIASTOLIC BLOOD PRESSURE: 73 MMHG | SYSTOLIC BLOOD PRESSURE: 121 MMHG | TEMPERATURE: 97.2 F | BODY MASS INDEX: 42.51 KG/M2 | WEIGHT: 263.4 LBS

## 2021-07-27 DIAGNOSIS — M25.562 CHRONIC PAIN OF BOTH KNEES: ICD-10-CM

## 2021-07-27 DIAGNOSIS — M25.561 CHRONIC PAIN OF BOTH KNEES: ICD-10-CM

## 2021-07-27 DIAGNOSIS — M75.41 ROTATOR CUFF IMPINGEMENT SYNDROME OF RIGHT SHOULDER: ICD-10-CM

## 2021-07-27 DIAGNOSIS — E66.01 MORBID OBESITY WITH BMI OF 40.0-44.9, ADULT (CMD): ICD-10-CM

## 2021-07-27 DIAGNOSIS — G89.29 CHRONIC PAIN OF BOTH KNEES: ICD-10-CM

## 2021-07-27 DIAGNOSIS — B37.2 CANDIDAL INTERTRIGO: ICD-10-CM

## 2021-07-27 DIAGNOSIS — M75.42 ROTATOR CUFF IMPINGEMENT SYNDROME, LEFT: Primary | ICD-10-CM

## 2021-07-27 DIAGNOSIS — R73.03 PREDIABETES: ICD-10-CM

## 2021-07-27 PROCEDURE — 99214 OFFICE O/P EST MOD 30 MIN: CPT | Performed by: FAMILY MEDICINE

## 2021-07-27 RX ORDER — NYSTATIN 100000 U/G
CREAM TOPICAL 2 TIMES DAILY
Qty: 30 G | Refills: 3 | Status: SHIPPED | OUTPATIENT
Start: 2021-07-27 | End: 2021-11-29 | Stop reason: SDUPTHER

## 2021-08-04 ENCOUNTER — HOSPITAL ENCOUNTER (OUTPATIENT)
Dept: PHYSICAL MEDICINE AND REHAB | Age: 71
Discharge: STILL ADMITTED OR ADMINISTRATIVE | End: 2021-08-04
Attending: FAMILY MEDICINE

## 2021-08-04 PROCEDURE — 97110 THERAPEUTIC EXERCISES: CPT | Performed by: PHYSICAL THERAPIST

## 2021-08-04 PROCEDURE — 97161 PT EVAL LOW COMPLEX 20 MIN: CPT | Performed by: PHYSICAL THERAPIST

## 2021-08-04 ASSESSMENT — ENCOUNTER SYMPTOMS: PAIN SEVERITY NOW: 5

## 2021-08-11 ENCOUNTER — HOSPITAL ENCOUNTER (OUTPATIENT)
Dept: PHYSICAL MEDICINE AND REHAB | Age: 71
Discharge: STILL ADMITTED OR ADMINISTRATIVE | End: 2021-08-11
Attending: FAMILY MEDICINE

## 2021-08-11 PROCEDURE — 97110 THERAPEUTIC EXERCISES: CPT | Performed by: PHYSICAL THERAPIST

## 2021-08-16 ENCOUNTER — APPOINTMENT (OUTPATIENT)
Dept: PHYSICAL MEDICINE AND REHAB | Age: 71
End: 2021-08-16
Attending: FAMILY MEDICINE

## 2021-08-18 ENCOUNTER — HOSPITAL ENCOUNTER (OUTPATIENT)
Dept: PHYSICAL MEDICINE AND REHAB | Age: 71
Discharge: STILL ADMITTED OR ADMINISTRATIVE | End: 2021-08-18
Attending: FAMILY MEDICINE

## 2021-08-18 PROCEDURE — 97110 THERAPEUTIC EXERCISES: CPT | Performed by: PHYSICAL THERAPIST

## 2021-08-27 ENCOUNTER — HOSPITAL ENCOUNTER (OUTPATIENT)
Dept: PHYSICAL MEDICINE AND REHAB | Age: 71
Discharge: STILL ADMITTED OR ADMINISTRATIVE | End: 2021-08-27
Attending: FAMILY MEDICINE

## 2021-08-27 PROCEDURE — 97140 MANUAL THERAPY 1/> REGIONS: CPT | Performed by: PHYSICAL THERAPIST

## 2021-08-27 PROCEDURE — 97110 THERAPEUTIC EXERCISES: CPT | Performed by: PHYSICAL THERAPIST

## 2021-08-31 ENCOUNTER — APPOINTMENT (OUTPATIENT)
Dept: FAMILY MEDICINE | Age: 71
End: 2021-08-31

## 2021-09-01 ENCOUNTER — APPOINTMENT (OUTPATIENT)
Dept: PHYSICAL MEDICINE AND REHAB | Age: 71
End: 2021-09-01
Attending: FAMILY MEDICINE

## 2021-09-03 ENCOUNTER — APPOINTMENT (OUTPATIENT)
Dept: PHYSICAL MEDICINE AND REHAB | Age: 71
End: 2021-09-03
Attending: FAMILY MEDICINE

## 2021-09-07 ENCOUNTER — APPOINTMENT (OUTPATIENT)
Dept: PHYSICAL MEDICINE AND REHAB | Age: 71
End: 2021-09-07
Attending: FAMILY MEDICINE

## 2021-09-16 ENCOUNTER — HOSPITAL ENCOUNTER (OUTPATIENT)
Dept: PHYSICAL MEDICINE AND REHAB | Age: 71
Discharge: STILL ADMITTED OR ADMINISTRATIVE | End: 2021-09-16
Attending: FAMILY MEDICINE

## 2021-09-16 PROCEDURE — 97110 THERAPEUTIC EXERCISES: CPT | Performed by: PHYSICAL THERAPIST

## 2021-11-09 ENCOUNTER — TELEPHONE (OUTPATIENT)
Dept: SCHEDULING | Age: 71
End: 2021-11-09

## 2021-11-29 ENCOUNTER — OFFICE VISIT (OUTPATIENT)
Dept: FAMILY MEDICINE | Age: 71
End: 2021-11-29

## 2021-11-29 VITALS
WEIGHT: 264 LBS | HEIGHT: 66 IN | DIASTOLIC BLOOD PRESSURE: 72 MMHG | HEART RATE: 59 BPM | TEMPERATURE: 97.6 F | SYSTOLIC BLOOD PRESSURE: 115 MMHG | BODY MASS INDEX: 42.43 KG/M2

## 2021-11-29 DIAGNOSIS — B37.2 CANDIDAL INTERTRIGO: ICD-10-CM

## 2021-11-29 DIAGNOSIS — M19.90 ARTHRITIS: ICD-10-CM

## 2021-11-29 DIAGNOSIS — M25.561 CHRONIC PAIN OF BOTH KNEES: ICD-10-CM

## 2021-11-29 DIAGNOSIS — M25.512 CHRONIC LEFT SHOULDER PAIN: ICD-10-CM

## 2021-11-29 DIAGNOSIS — Z12.11 SCREENING FOR COLON CANCER: ICD-10-CM

## 2021-11-29 DIAGNOSIS — Z11.59 NEED FOR HEPATITIS C SCREENING TEST: ICD-10-CM

## 2021-11-29 DIAGNOSIS — G89.29 CHRONIC LEFT SHOULDER PAIN: ICD-10-CM

## 2021-11-29 DIAGNOSIS — G89.29 CHRONIC PAIN OF BOTH KNEES: ICD-10-CM

## 2021-11-29 DIAGNOSIS — R73.03 PREDIABETES: ICD-10-CM

## 2021-11-29 DIAGNOSIS — Z00.00 ENCOUNTER FOR HEALTH MAINTENANCE EXAMINATION: Primary | ICD-10-CM

## 2021-11-29 DIAGNOSIS — E66.01 MORBID OBESITY WITH BMI OF 40.0-44.9, ADULT (CMD): ICD-10-CM

## 2021-11-29 DIAGNOSIS — G47.33 OSA ON CPAP: ICD-10-CM

## 2021-11-29 DIAGNOSIS — M25.562 CHRONIC PAIN OF BOTH KNEES: ICD-10-CM

## 2021-11-29 PROCEDURE — 99397 PER PM REEVAL EST PAT 65+ YR: CPT | Performed by: FAMILY MEDICINE

## 2021-11-29 PROCEDURE — 90662 IIV NO PRSV INCREASED AG IM: CPT | Performed by: FAMILY MEDICINE

## 2021-11-29 RX ORDER — NABUMETONE 750 MG/1
750 TABLET, FILM COATED ORAL 2 TIMES DAILY
Qty: 180 TABLET | Refills: 3 | Status: SHIPPED | OUTPATIENT
Start: 2021-11-29 | End: 2022-11-24

## 2021-11-29 RX ORDER — NYSTATIN 100000 U/G
CREAM TOPICAL 2 TIMES DAILY
Qty: 30 G | Refills: 3 | Status: SHIPPED | OUTPATIENT
Start: 2021-11-29 | End: 2022-11-04 | Stop reason: SDUPTHER

## 2021-11-29 RX ORDER — PREDNISONE 50 MG/1
50 TABLET ORAL DAILY
Qty: 5 TABLET | Refills: 0 | Status: SHIPPED | OUTPATIENT
Start: 2021-11-29 | End: 2022-11-04 | Stop reason: ALTCHOICE

## 2021-11-29 RX ORDER — LIDOCAINE 50 MG/G
OINTMENT TOPICAL PRN
Qty: 50 G | Refills: 0 | Status: SHIPPED | OUTPATIENT
Start: 2021-11-29

## 2021-11-30 ENCOUNTER — TELEPHONE (OUTPATIENT)
Dept: FAMILY MEDICINE | Age: 71
End: 2021-11-30

## 2021-12-02 ENCOUNTER — TELEPHONE (OUTPATIENT)
Dept: FAMILY MEDICINE | Age: 71
End: 2021-12-02

## 2021-12-03 ENCOUNTER — IMAGING SERVICES (OUTPATIENT)
Dept: GENERAL RADIOLOGY | Age: 71
End: 2021-12-03
Attending: FAMILY MEDICINE

## 2021-12-03 DIAGNOSIS — M25.512 CHRONIC LEFT SHOULDER PAIN: ICD-10-CM

## 2021-12-03 DIAGNOSIS — G89.29 CHRONIC LEFT SHOULDER PAIN: ICD-10-CM

## 2021-12-03 PROCEDURE — 73030 X-RAY EXAM OF SHOULDER: CPT | Performed by: FAMILY MEDICINE

## 2021-12-06 ENCOUNTER — LAB SERVICES (OUTPATIENT)
Dept: LAB | Age: 71
End: 2021-12-06

## 2021-12-06 DIAGNOSIS — Z12.11 SCREENING FOR COLON CANCER: ICD-10-CM

## 2021-12-06 PROCEDURE — 82274 ASSAY TEST FOR BLOOD FECAL: CPT | Performed by: PSYCHIATRY & NEUROLOGY

## 2021-12-07 LAB — HEMOCCULT STL QL: NEGATIVE

## 2021-12-18 ENCOUNTER — LAB SERVICES (OUTPATIENT)
Dept: LAB | Age: 71
End: 2021-12-18

## 2021-12-18 DIAGNOSIS — E66.01 MORBID OBESITY WITH BMI OF 40.0-44.9, ADULT (CMD): ICD-10-CM

## 2021-12-18 DIAGNOSIS — D64.9 NORMOCYTIC ANEMIA: ICD-10-CM

## 2021-12-18 DIAGNOSIS — Z11.59 NEED FOR HEPATITIS C SCREENING TEST: ICD-10-CM

## 2021-12-18 LAB
ALBUMIN SERPL-MCNC: 3.3 G/DL (ref 3.6–5.1)
ALBUMIN/GLOB SERPL: 0.9 {RATIO} (ref 1–2.4)
ALP SERPL-CCNC: 84 UNITS/L (ref 45–117)
ALT SERPL-CCNC: 22 UNITS/L
ANION GAP SERPL CALC-SCNC: 10 MMOL/L (ref 10–20)
AST SERPL-CCNC: 17 UNITS/L
BASOPHILS # BLD: 0.1 K/MCL (ref 0–0.3)
BASOPHILS NFR BLD: 1 %
BILIRUB SERPL-MCNC: 0.9 MG/DL (ref 0.2–1)
BUN SERPL-MCNC: 21 MG/DL (ref 6–20)
BUN/CREAT SERPL: 17 (ref 7–25)
CALCIUM SERPL-MCNC: 9.1 MG/DL (ref 8.4–10.2)
CHLORIDE SERPL-SCNC: 104 MMOL/L (ref 98–107)
CHOLEST SERPL-MCNC: 173 MG/DL
CHOLEST/HDLC SERPL: 3.1 {RATIO}
CO2 SERPL-SCNC: 28 MMOL/L (ref 21–32)
CREAT SERPL-MCNC: 1.22 MG/DL (ref 0.67–1.17)
DEPRECATED RDW RBC: 40.5 FL (ref 39–50)
EOSINOPHIL # BLD: 0.2 K/MCL (ref 0–0.5)
EOSINOPHIL NFR BLD: 3 %
ERYTHROCYTE [DISTWIDTH] IN BLOOD: 14.2 % (ref 11–15)
FASTING DURATION TIME PATIENT: 12 HOURS (ref 0–999)
FASTING DURATION TIME PATIENT: NORMAL H
GFR SERPLBLD BASED ON 1.73 SQ M-ARVRAT: 60 ML/MIN
GLOBULIN SER-MCNC: 3.7 G/DL (ref 2–4)
GLUCOSE SERPL-MCNC: 100 MG/DL (ref 70–99)
HBA1C MFR BLD: 6.1 % (ref 4.5–5.6)
HCT VFR BLD CALC: 40.4 % (ref 39–51)
HCV AB SER QL: NEGATIVE
HDLC SERPL-MCNC: 56 MG/DL
HGB BLD-MCNC: 12.5 G/DL (ref 13–17)
IMM GRANULOCYTES # BLD AUTO: 0 K/MCL (ref 0–0.2)
IMM GRANULOCYTES # BLD: 0 %
LDLC SERPL CALC-MCNC: 98 MG/DL
LYMPHOCYTES # BLD: 2.3 K/MCL (ref 1–4)
LYMPHOCYTES NFR BLD: 31 %
MCH RBC QN AUTO: 24.4 PG (ref 26–34)
MCHC RBC AUTO-ENTMCNC: 30.9 G/DL (ref 32–36.5)
MCV RBC AUTO: 78.8 FL (ref 78–100)
MONOCYTES # BLD: 0.9 K/MCL (ref 0.3–0.9)
MONOCYTES NFR BLD: 13 %
NEUTROPHILS # BLD: 3.9 K/MCL (ref 1.8–7.7)
NEUTROPHILS NFR BLD: 52 %
NONHDLC SERPL-MCNC: 117 MG/DL
NRBC BLD MANUAL-RTO: 0 /100 WBC
PLATELET # BLD AUTO: 205 K/MCL (ref 140–450)
POTASSIUM SERPL-SCNC: 4.6 MMOL/L (ref 3.4–5.1)
PROT SERPL-MCNC: 7 G/DL (ref 6.4–8.2)
RBC # BLD: 5.13 MIL/MCL (ref 4.5–5.9)
SODIUM SERPL-SCNC: 137 MMOL/L (ref 135–145)
TRIGL SERPL-MCNC: 97 MG/DL
TSH SERPL-ACNC: 1.35 MCUNITS/ML (ref 0.35–5)
WBC # BLD: 7.4 K/MCL (ref 4.2–11)

## 2021-12-18 PROCEDURE — 80050 GENERAL HEALTH PANEL: CPT | Performed by: PSYCHIATRY & NEUROLOGY

## 2021-12-18 PROCEDURE — 83550 IRON BINDING TEST: CPT | Performed by: PSYCHIATRY & NEUROLOGY

## 2021-12-18 PROCEDURE — 82728 ASSAY OF FERRITIN: CPT | Performed by: PSYCHIATRY & NEUROLOGY

## 2021-12-18 PROCEDURE — 86803 HEPATITIS C AB TEST: CPT | Performed by: PSYCHIATRY & NEUROLOGY

## 2021-12-18 PROCEDURE — 83540 ASSAY OF IRON: CPT | Performed by: PSYCHIATRY & NEUROLOGY

## 2021-12-18 PROCEDURE — 36415 COLL VENOUS BLD VENIPUNCTURE: CPT | Performed by: PSYCHIATRY & NEUROLOGY

## 2021-12-18 PROCEDURE — 80061 LIPID PANEL: CPT | Performed by: PSYCHIATRY & NEUROLOGY

## 2021-12-18 PROCEDURE — 83036 HEMOGLOBIN GLYCOSYLATED A1C: CPT | Performed by: PSYCHIATRY & NEUROLOGY

## 2021-12-19 DIAGNOSIS — D64.9 NORMOCYTIC ANEMIA: Primary | ICD-10-CM

## 2021-12-19 LAB
FERRITIN SERPL-MCNC: 31 NG/ML (ref 26–388)
IRON SATN MFR SERPL: 24 % (ref 15–45)
IRON SERPL-MCNC: 83 MCG/DL (ref 65–175)
TIBC SERPL-MCNC: 343 MCG/DL (ref 250–450)

## 2021-12-28 ENCOUNTER — TELEPHONE (OUTPATIENT)
Dept: SCHEDULING | Age: 71
End: 2021-12-28

## 2021-12-29 ENCOUNTER — APPOINTMENT (OUTPATIENT)
Dept: FAMILY MEDICINE | Age: 71
End: 2021-12-29

## 2022-01-07 ENCOUNTER — TELEPHONE (OUTPATIENT)
Dept: FAMILY MEDICINE | Age: 72
End: 2022-01-07

## 2022-01-07 ENCOUNTER — OFFICE VISIT (OUTPATIENT)
Dept: FAMILY MEDICINE | Age: 72
End: 2022-01-07

## 2022-01-07 VITALS
HEIGHT: 66 IN | HEART RATE: 57 BPM | TEMPERATURE: 97.3 F | BODY MASS INDEX: 42.43 KG/M2 | SYSTOLIC BLOOD PRESSURE: 109 MMHG | WEIGHT: 264 LBS | DIASTOLIC BLOOD PRESSURE: 69 MMHG

## 2022-01-07 DIAGNOSIS — G89.29 CHRONIC LEFT SHOULDER PAIN: Primary | ICD-10-CM

## 2022-01-07 DIAGNOSIS — M25.512 CHRONIC LEFT SHOULDER PAIN: Primary | ICD-10-CM

## 2022-01-07 PROCEDURE — 20610 DRAIN/INJ JOINT/BURSA W/O US: CPT | Performed by: FAMILY MEDICINE

## 2022-01-07 PROCEDURE — 99213 OFFICE O/P EST LOW 20 MIN: CPT | Performed by: FAMILY MEDICINE

## 2022-01-07 ASSESSMENT — PAIN SCALES - GENERAL: PAINLEVEL: 6

## 2022-01-18 ENCOUNTER — TELEPHONE (OUTPATIENT)
Dept: SCHEDULING | Age: 72
End: 2022-01-18

## 2022-01-20 ENCOUNTER — APPOINTMENT (OUTPATIENT)
Dept: PHYSICAL MEDICINE AND REHAB | Age: 72
End: 2022-01-20
Attending: FAMILY MEDICINE

## 2022-01-27 ENCOUNTER — APPOINTMENT (OUTPATIENT)
Dept: PHYSICAL MEDICINE AND REHAB | Age: 72
End: 2022-01-27
Attending: FAMILY MEDICINE

## 2022-02-03 ENCOUNTER — APPOINTMENT (OUTPATIENT)
Dept: PHYSICAL MEDICINE AND REHAB | Age: 72
End: 2022-02-03
Attending: FAMILY MEDICINE

## 2022-05-25 ENCOUNTER — APPOINTMENT (OUTPATIENT)
Dept: SLEEP MEDICINE | Age: 72
End: 2022-05-25
Attending: NURSE PRACTITIONER

## 2022-05-26 ENCOUNTER — TELEPHONE (OUTPATIENT)
Dept: SLEEP MEDICINE | Age: 72
End: 2022-05-26

## 2022-08-02 ENCOUNTER — EXTERNAL LAB (OUTPATIENT)
Dept: OTHER | Age: 72
End: 2022-08-02

## 2022-08-02 ENCOUNTER — EXTERNAL RECORD (OUTPATIENT)
Dept: HEALTH INFORMATION MANAGEMENT | Facility: OTHER | Age: 72
End: 2022-08-02

## 2022-08-02 LAB — LAB RESULT: NORMAL

## 2022-08-30 ENCOUNTER — APPOINTMENT (OUTPATIENT)
Dept: FAMILY MEDICINE | Age: 72
End: 2022-08-30

## 2022-11-01 ENCOUNTER — TELEPHONE (OUTPATIENT)
Dept: SCHEDULING | Age: 72
End: 2022-11-01

## 2022-11-04 ENCOUNTER — HOSPITAL ENCOUNTER (OUTPATIENT)
Dept: GENERAL RADIOLOGY | Age: 72
Discharge: HOME | End: 2022-11-04
Attending: FAMILY MEDICINE

## 2022-11-04 ENCOUNTER — LAB SERVICES (OUTPATIENT)
Dept: LAB | Age: 72
End: 2022-11-04

## 2022-11-04 ENCOUNTER — APPOINTMENT (OUTPATIENT)
Dept: FAMILY MEDICINE | Age: 72
End: 2022-11-04

## 2022-11-04 ENCOUNTER — OFFICE VISIT (OUTPATIENT)
Dept: FAMILY MEDICINE | Age: 72
End: 2022-11-04

## 2022-11-04 VITALS
DIASTOLIC BLOOD PRESSURE: 73 MMHG | TEMPERATURE: 97.9 F | HEIGHT: 66 IN | OXYGEN SATURATION: 99 % | SYSTOLIC BLOOD PRESSURE: 131 MMHG | BODY MASS INDEX: 41.62 KG/M2 | WEIGHT: 259 LBS | HEART RATE: 67 BPM

## 2022-11-04 DIAGNOSIS — Z13.228 SCREENING FOR ENDOCRINE, NUTRITIONAL, METABOLIC AND IMMUNITY DISORDER: ICD-10-CM

## 2022-11-04 DIAGNOSIS — G47.33 OSA ON CPAP: ICD-10-CM

## 2022-11-04 DIAGNOSIS — B37.2 CANDIDAL INTERTRIGO: ICD-10-CM

## 2022-11-04 DIAGNOSIS — R73.03 PREDIABETES: ICD-10-CM

## 2022-11-04 DIAGNOSIS — Z13.21 SCREENING FOR ENDOCRINE, NUTRITIONAL, METABOLIC AND IMMUNITY DISORDER: ICD-10-CM

## 2022-11-04 DIAGNOSIS — D64.9 NORMOCYTIC ANEMIA: Primary | ICD-10-CM

## 2022-11-04 DIAGNOSIS — Z12.11 SCREENING FOR COLON CANCER: ICD-10-CM

## 2022-11-04 DIAGNOSIS — Z13.0 SCREENING FOR ENDOCRINE, NUTRITIONAL, METABOLIC AND IMMUNITY DISORDER: ICD-10-CM

## 2022-11-04 DIAGNOSIS — E78.5 HYPERLIPIDEMIA, UNSPECIFIED HYPERLIPIDEMIA TYPE: ICD-10-CM

## 2022-11-04 DIAGNOSIS — M79.645 PAIN OF LEFT THUMB: ICD-10-CM

## 2022-11-04 DIAGNOSIS — F41.9 ANXIETY: ICD-10-CM

## 2022-11-04 DIAGNOSIS — E66.01 MORBID OBESITY WITH BMI OF 40.0-44.9, ADULT (CMD): ICD-10-CM

## 2022-11-04 DIAGNOSIS — Z13.29 SCREENING FOR ENDOCRINE, NUTRITIONAL, METABOLIC AND IMMUNITY DISORDER: ICD-10-CM

## 2022-11-04 DIAGNOSIS — D64.9 NORMOCYTIC ANEMIA: ICD-10-CM

## 2022-11-04 DIAGNOSIS — M19.90 ARTHRITIS: ICD-10-CM

## 2022-11-04 LAB
25(OH)D3+25(OH)D2 SERPL-MCNC: 26.3 NG/ML (ref 30–100)
ALBUMIN SERPL-MCNC: 3.5 G/DL (ref 3.6–5.1)
ALBUMIN/GLOB SERPL: 0.9 {RATIO} (ref 1–2.4)
ALP SERPL-CCNC: 92 UNITS/L (ref 45–117)
ALT SERPL-CCNC: 23 UNITS/L
ANION GAP SERPL CALC-SCNC: 9 MMOL/L (ref 7–19)
AST SERPL-CCNC: 16 UNITS/L
BASOPHILS # BLD: 0 K/MCL (ref 0–0.3)
BASOPHILS NFR BLD: 1 %
BILIRUB SERPL-MCNC: 0.5 MG/DL (ref 0.2–1)
BUN SERPL-MCNC: 23 MG/DL (ref 6–20)
BUN/CREAT SERPL: 21 (ref 7–25)
CALCIUM SERPL-MCNC: 9.1 MG/DL (ref 8.4–10.2)
CHLORIDE SERPL-SCNC: 107 MMOL/L (ref 97–110)
CHOLEST SERPL-MCNC: 136 MG/DL
CHOLEST/HDLC SERPL: 2.3 {RATIO}
CO2 SERPL-SCNC: 28 MMOL/L (ref 21–32)
CREAT SERPL-MCNC: 1.08 MG/DL (ref 0.67–1.17)
DEPRECATED RDW RBC: 40 FL (ref 39–50)
EOSINOPHIL # BLD: 0.3 K/MCL (ref 0–0.5)
EOSINOPHIL NFR BLD: 4 %
ERYTHROCYTE [DISTWIDTH] IN BLOOD: 14.3 % (ref 11–15)
FASTING DURATION TIME PATIENT: ABNORMAL H
FASTING DURATION TIME PATIENT: NORMAL H
FOLATE SERPL-MCNC: 7.8 NG/ML
GFR SERPLBLD BASED ON 1.73 SQ M-ARVRAT: 73 ML/MIN
GLOBULIN SER-MCNC: 3.7 G/DL (ref 2–4)
GLUCOSE SERPL-MCNC: 110 MG/DL (ref 70–99)
HBA1C MFR BLD: 6 % (ref 4.5–5.6)
HCT VFR BLD CALC: 39.6 % (ref 39–51)
HDLC SERPL-MCNC: 59 MG/DL
HGB BLD-MCNC: 12.4 G/DL (ref 13–17)
IMM GRANULOCYTES # BLD AUTO: 0 K/MCL (ref 0–0.2)
IMM GRANULOCYTES # BLD: 0 %
LDLC SERPL CALC-MCNC: 60 MG/DL
LYMPHOCYTES # BLD: 2.5 K/MCL (ref 1–4)
LYMPHOCYTES NFR BLD: 40 %
MCH RBC QN AUTO: 24.6 PG (ref 26–34)
MCHC RBC AUTO-ENTMCNC: 31.3 G/DL (ref 32–36.5)
MCV RBC AUTO: 78.6 FL (ref 78–100)
MONOCYTES # BLD: 0.7 K/MCL (ref 0.3–0.9)
MONOCYTES NFR BLD: 11 %
NEUTROPHILS # BLD: 2.8 K/MCL (ref 1.8–7.7)
NEUTROPHILS NFR BLD: 44 %
NONHDLC SERPL-MCNC: 77 MG/DL
NRBC BLD MANUAL-RTO: 0 /100 WBC
PLATELET # BLD AUTO: 209 K/MCL (ref 140–450)
POTASSIUM SERPL-SCNC: 4 MMOL/L (ref 3.4–5.1)
PROT SERPL-MCNC: 7.2 G/DL (ref 6.4–8.2)
RBC # BLD: 5.04 MIL/MCL (ref 4.5–5.9)
SODIUM SERPL-SCNC: 140 MMOL/L (ref 135–145)
TRIGL SERPL-MCNC: 83 MG/DL
VIT B12 SERPL-MCNC: 359 PG/ML (ref 211–911)
WBC # BLD: 6.4 K/MCL (ref 4.2–11)

## 2022-11-04 PROCEDURE — 80053 COMPREHEN METABOLIC PANEL: CPT | Performed by: INTERNAL MEDICINE

## 2022-11-04 PROCEDURE — 99215 OFFICE O/P EST HI 40 MIN: CPT | Performed by: FAMILY MEDICINE

## 2022-11-04 PROCEDURE — 73110 X-RAY EXAM OF WRIST: CPT

## 2022-11-04 PROCEDURE — 83036 HEMOGLOBIN GLYCOSYLATED A1C: CPT | Performed by: INTERNAL MEDICINE

## 2022-11-04 PROCEDURE — 36415 COLL VENOUS BLD VENIPUNCTURE: CPT | Performed by: INTERNAL MEDICINE

## 2022-11-04 PROCEDURE — 82306 VITAMIN D 25 HYDROXY: CPT | Performed by: INTERNAL MEDICINE

## 2022-11-04 PROCEDURE — 83540 ASSAY OF IRON: CPT | Performed by: INTERNAL MEDICINE

## 2022-11-04 PROCEDURE — 83550 IRON BINDING TEST: CPT | Performed by: INTERNAL MEDICINE

## 2022-11-04 PROCEDURE — 85025 COMPLETE CBC W/AUTO DIFF WBC: CPT | Performed by: INTERNAL MEDICINE

## 2022-11-04 PROCEDURE — 82607 VITAMIN B-12: CPT | Performed by: INTERNAL MEDICINE

## 2022-11-04 PROCEDURE — 80061 LIPID PANEL: CPT | Performed by: INTERNAL MEDICINE

## 2022-11-04 PROCEDURE — 86431 RHEUMATOID FACTOR QUANT: CPT | Performed by: INTERNAL MEDICINE

## 2022-11-04 PROCEDURE — 82728 ASSAY OF FERRITIN: CPT | Performed by: INTERNAL MEDICINE

## 2022-11-04 PROCEDURE — 82746 ASSAY OF FOLIC ACID SERUM: CPT | Performed by: INTERNAL MEDICINE

## 2022-11-04 PROCEDURE — 73140 X-RAY EXAM OF FINGER(S): CPT

## 2022-11-04 RX ORDER — ATORVASTATIN CALCIUM 40 MG/1
40 TABLET, FILM COATED ORAL DAILY
Qty: 90 TABLET | Refills: 4 | Status: SHIPPED | OUTPATIENT
Start: 2022-11-04 | End: 2023-05-09 | Stop reason: ALTCHOICE

## 2022-11-04 RX ORDER — NYSTATIN 100000 U/G
CREAM TOPICAL 2 TIMES DAILY
Qty: 30 G | Refills: 5 | Status: SHIPPED | OUTPATIENT
Start: 2022-11-04

## 2022-11-04 RX ORDER — ESCITALOPRAM OXALATE 20 MG/1
20 TABLET ORAL DAILY
Qty: 90 TABLET | Refills: 4 | Status: SHIPPED | OUTPATIENT
Start: 2022-11-04

## 2022-11-04 ASSESSMENT — PATIENT HEALTH QUESTIONNAIRE - PHQ9
SUM OF ALL RESPONSES TO PHQ9 QUESTIONS 1 AND 2: 0
SUM OF ALL RESPONSES TO PHQ9 QUESTIONS 1 AND 2: 0
CLINICAL INTERPRETATION OF PHQ2 SCORE: NO FURTHER SCREENING NEEDED
1. LITTLE INTEREST OR PLEASURE IN DOING THINGS: NOT AT ALL
2. FEELING DOWN, DEPRESSED OR HOPELESS: NOT AT ALL

## 2022-11-10 ENCOUNTER — TELEPHONE (OUTPATIENT)
Dept: FAMILY MEDICINE | Age: 72
End: 2022-11-10

## 2022-11-10 DIAGNOSIS — D64.9 NORMOCYTIC ANEMIA: Primary | ICD-10-CM

## 2022-11-10 LAB
FERRITIN SERPL-MCNC: 21 NG/ML (ref 26–388)
IRON SATN MFR SERPL: 12 % (ref 15–45)
IRON SERPL-MCNC: 43 MCG/DL (ref 65–175)
RHEUMATOID FACT SER NEPH-ACNC: 19 UNITS/ML
TIBC SERPL-MCNC: 355 MCG/DL (ref 250–450)

## 2022-11-23 DIAGNOSIS — D64.9 NORMOCYTIC ANEMIA: Primary | ICD-10-CM

## 2022-11-23 DIAGNOSIS — M05.79 RHEUMATOID ARTHRITIS INVOLVING MULTIPLE SITES WITH POSITIVE RHEUMATOID FACTOR (CMD): ICD-10-CM

## 2022-12-10 ENCOUNTER — E-ADVICE (OUTPATIENT)
Dept: FAMILY MEDICINE | Age: 72
End: 2022-12-10

## 2022-12-10 DIAGNOSIS — M25.561 CHRONIC PAIN OF BOTH KNEES: ICD-10-CM

## 2022-12-10 DIAGNOSIS — M25.562 CHRONIC PAIN OF BOTH KNEES: ICD-10-CM

## 2022-12-10 DIAGNOSIS — E66.01 MORBID OBESITY WITH BMI OF 40.0-44.9, ADULT (CMD): Primary | ICD-10-CM

## 2022-12-10 DIAGNOSIS — G89.29 CHRONIC PAIN OF BOTH KNEES: ICD-10-CM

## 2022-12-10 DIAGNOSIS — M05.79 RHEUMATOID ARTHRITIS INVOLVING MULTIPLE SITES WITH POSITIVE RHEUMATOID FACTOR (CMD): ICD-10-CM

## 2022-12-10 DIAGNOSIS — M19.90 ARTHRITIS: ICD-10-CM

## 2022-12-29 ENCOUNTER — TELEPHONE (OUTPATIENT)
Dept: FAMILY MEDICINE | Age: 72
End: 2022-12-29

## 2023-05-09 ENCOUNTER — OFFICE VISIT (OUTPATIENT)
Dept: SLEEP MEDICINE | Age: 73
End: 2023-05-09
Attending: SPECIALIST

## 2023-05-09 VITALS
HEART RATE: 66 BPM | DIASTOLIC BLOOD PRESSURE: 70 MMHG | OXYGEN SATURATION: 99 % | RESPIRATION RATE: 16 BRPM | HEIGHT: 66 IN | SYSTOLIC BLOOD PRESSURE: 114 MMHG | BODY MASS INDEX: 41.62 KG/M2 | WEIGHT: 259 LBS

## 2023-05-09 DIAGNOSIS — E66.01 MORBID OBESITY WITH BMI OF 40.0-44.9, ADULT (CMD): ICD-10-CM

## 2023-05-09 DIAGNOSIS — G47.33 OSA ON CPAP: Primary | ICD-10-CM

## 2023-05-09 PROCEDURE — 99214 OFFICE O/P EST MOD 30 MIN: CPT | Performed by: SPECIALIST

## 2023-05-09 PROCEDURE — 99213 OFFICE O/P EST LOW 20 MIN: CPT | Performed by: SPECIALIST

## 2023-05-09 RX ORDER — PRAVASTATIN SODIUM 40 MG
40 TABLET ORAL DAILY
COMMUNITY

## 2023-05-09 ASSESSMENT — ENCOUNTER SYMPTOMS
CONSTITUTIONAL NEGATIVE: 1
SNORING: 1
EYES NEGATIVE: 1
ENDOCRINE NEGATIVE: 1
SLEEP DISTURBANCES DUE TO BREATHING: 1
GASTROINTESTINAL NEGATIVE: 1
HEMATOLOGIC/LYMPHATIC NEGATIVE: 1
EXCESSIVE DAYTIME SLEEPINESS: 1

## 2023-08-22 ENCOUNTER — TELEPHONE (OUTPATIENT)
Dept: SLEEP MEDICINE | Age: 73
End: 2023-08-22

## 2023-08-22 DIAGNOSIS — G47.33 OSA (OBSTRUCTIVE SLEEP APNEA): Primary | ICD-10-CM

## 2023-09-19 ENCOUNTER — OFFICE VISIT (OUTPATIENT)
Dept: RHEUMATOLOGY | Facility: CLINIC | Age: 73
End: 2023-09-19
Payer: MEDICAID

## 2023-09-19 ENCOUNTER — PATIENT MESSAGE (OUTPATIENT)
Dept: RHEUMATOLOGY | Facility: CLINIC | Age: 73
End: 2023-09-19

## 2023-09-19 VITALS
SYSTOLIC BLOOD PRESSURE: 148 MMHG | DIASTOLIC BLOOD PRESSURE: 62 MMHG | BODY MASS INDEX: 42.75 KG/M2 | HEART RATE: 62 BPM | RESPIRATION RATE: 16 BRPM | WEIGHT: 266 LBS | TEMPERATURE: 97 F | OXYGEN SATURATION: 99 % | HEIGHT: 66 IN

## 2023-09-19 DIAGNOSIS — M51.36 DDD (DEGENERATIVE DISC DISEASE), LUMBAR: ICD-10-CM

## 2023-09-19 DIAGNOSIS — G89.29 CHRONIC SI JOINT PAIN: ICD-10-CM

## 2023-09-19 DIAGNOSIS — M25.562 CHRONIC PAIN OF BOTH KNEES: ICD-10-CM

## 2023-09-19 DIAGNOSIS — M25.532 LEFT WRIST PAIN: ICD-10-CM

## 2023-09-19 DIAGNOSIS — M65.4 DE QUERVAIN'S TENOSYNOVITIS, LEFT: ICD-10-CM

## 2023-09-19 DIAGNOSIS — M79.605 PAIN IN BOTH LOWER EXTREMITIES: ICD-10-CM

## 2023-09-19 DIAGNOSIS — M25.50 POLYARTHRALGIA: Primary | ICD-10-CM

## 2023-09-19 DIAGNOSIS — M25.561 CHRONIC PAIN OF BOTH KNEES: ICD-10-CM

## 2023-09-19 DIAGNOSIS — G89.29 CHRONIC PAIN OF BOTH KNEES: ICD-10-CM

## 2023-09-19 DIAGNOSIS — E55.9 VITAMIN D DEFICIENCY: ICD-10-CM

## 2023-09-19 DIAGNOSIS — M53.3 CHRONIC SI JOINT PAIN: ICD-10-CM

## 2023-09-19 DIAGNOSIS — M79.604 PAIN IN BOTH LOWER EXTREMITIES: ICD-10-CM

## 2023-09-19 PROBLEM — M19.90 ARTHRITIS: Status: ACTIVE | Noted: 2017-02-22

## 2023-09-19 PROBLEM — F41.9 ANXIETY: Status: ACTIVE | Noted: 2020-10-19

## 2023-09-19 PROBLEM — G47.33 OSA ON CPAP: Status: ACTIVE | Noted: 2021-05-19

## 2023-09-19 PROBLEM — R73.03 PREDIABETES: Status: ACTIVE | Noted: 2020-10-19

## 2023-09-19 PROBLEM — E78.5 HYPERLIPIDEMIA: Status: ACTIVE | Noted: 2020-11-20

## 2023-09-19 PROCEDURE — 3008F BODY MASS INDEX DOCD: CPT | Performed by: INTERNAL MEDICINE

## 2023-09-19 PROCEDURE — 3078F DIAST BP <80 MM HG: CPT | Performed by: INTERNAL MEDICINE

## 2023-09-19 PROCEDURE — 3077F SYST BP >= 140 MM HG: CPT | Performed by: INTERNAL MEDICINE

## 2023-09-19 PROCEDURE — 99204 OFFICE O/P NEW MOD 45 MIN: CPT | Performed by: INTERNAL MEDICINE

## 2023-09-19 RX ORDER — MELATONIN
400 DAILY
COMMUNITY

## 2023-09-22 ENCOUNTER — TELEPHONE (OUTPATIENT)
Dept: SLEEP MEDICINE | Age: 73
End: 2023-09-22

## 2023-09-29 NOTE — TELEPHONE ENCOUNTER
[facility] called -They have no record of BLAZE testing or SED rate  All they have on file is: CBC, CMP, iron, ferritin    Labs: [place]- PCP is in, they will be faxing results in the next couple days. PCP is now [facility]  since December:   Previous PCP: Yokasta Sepulveda-   Called: No BLAZE or SED rate from there either.

## 2023-10-12 ENCOUNTER — VIRTUAL PHONE E/M (OUTPATIENT)
Dept: RHEUMATOLOGY | Facility: CLINIC | Age: 73
End: 2023-10-12
Payer: MEDICAID

## 2023-10-12 DIAGNOSIS — M05.741 RHEUMATOID ARTHRITIS INVOLVING BOTH HANDS WITH POSITIVE RHEUMATOID FACTOR (HCC): Primary | ICD-10-CM

## 2023-10-12 DIAGNOSIS — M05.742 RHEUMATOID ARTHRITIS INVOLVING BOTH HANDS WITH POSITIVE RHEUMATOID FACTOR (HCC): Primary | ICD-10-CM

## 2023-10-12 DIAGNOSIS — E55.9 VITAMIN D DEFICIENCY: ICD-10-CM

## 2023-10-12 DIAGNOSIS — M51.36 DDD (DEGENERATIVE DISC DISEASE), LUMBAR: ICD-10-CM

## 2023-10-12 DIAGNOSIS — M25.50 POLYARTHRALGIA: ICD-10-CM

## 2023-10-12 PROCEDURE — 99443 PHONE E/M BY PHYS 21-30 MIN: CPT | Performed by: INTERNAL MEDICINE

## 2023-10-12 RX ORDER — HYDROXYCHLOROQUINE SULFATE 200 MG/1
200 TABLET, FILM COATED ORAL 2 TIMES DAILY
Qty: 180 TABLET | Refills: 0 | Status: SHIPPED | OUTPATIENT
Start: 2023-10-12

## 2023-10-12 RX ORDER — ERGOCALCIFEROL 1.25 MG/1
50000 CAPSULE ORAL WEEKLY
Qty: 12 CAPSULE | Refills: 0 | Status: SHIPPED | OUTPATIENT
Start: 2023-10-12 | End: 2024-01-04

## 2023-10-12 NOTE — PROGRESS NOTES
Virtual Telephone Check-In    Peter Xiong verbally consents to a Virtual/Telephone Check-In visit on 10/12/23. Patient has been referred to the [facility] website at [email or web address] to review the yearly Consent to Treat document. Patient understands and accepts financial responsibility for any deductible, co-insurance and/or co-pays associated with this service. 09/28/2023  CK 51 normal  Uric acid 6.2 normal  Vitamin D 21.9 low  B12 353 normal  CCP negative  CRP 0.4 normal  RF 27.7 positive    X-rays SI joint- Unremarkable  X-rays lumbar spine  Partial sacralization L5 noted. Normal anatomic alignment of the lumbar spine is seen. The vertebral body heights are well maintained. Mild disc space narrowing noted mainly at the L4-L5 level. Small anterior spurs seen at the L4-L5 level. Small right lateral spur seen at the L2-L3 and L3-L4 levels. Moderate facet arthritis noted especially involving the L5-S1 level bilaterally. Impression multilevel mild to moderate lumbar spondylosis most pronounced involving the lower lumbar facets  Xray wrist received after visit- shows degenerative changes no signs of inflammatory arthritis per report. Called pt and discuss results in detail with him and his daughter in law who served as  per pt preference. Reports having some worsened wrist pain but otherwise, feeling about the same. Discussed results consistent with RF positivity. Will try trial of plaquenil and monitor for improvement of symptoms. Reminded this will not help with the lower back pain/radiculopathy like symptoms. For that, I recommended physical therapy and potentially pain management evaluation. Offered to put in referral for [facility] but they would like to message me with somebody closer to home. Recommended annual eye exams while on the Plaquenil. Regardless, patient does have diabetes and has not had an eye exam since his cataract surgery in 2021.   Strongly recommended he do this both for monitoring for toxicity from the Plaquenil as well as monitoring for complications from diabetes. Of note, patient also has vitamin D deficiency for which I recommended once weekly vitamin D prescription x12 weeks. PT orders to be mailed to home. We will follow-up in another 3 months or sooner as needed. Encourage patient to reach out if symptoms change or worsen in the meantime. Answered multiple questions from pt's daughter in law. -We discussed the risks and benefits of Plaquenil therapy.  -Plaquenil is an antimalarial medication used for its anti-inflammatory properties in systemic lupus erythematosus for the reduction of flares and complications associated with the disease. This is also been demonstrated to help improve arthropathies. Side effects of Plaquenil include retinal deposition, which may reflect unchecked, may lead to visual changes. This was discussed as was the recommendation that an ophthalmologist be seen every 6-12 months for evaluation. Other side effects include gastro intestinal upset, including nausea, vomiting, and loose stools. Rarely, Plaquenil can also be associated with myopathy. This was discussed in detail, and it was agreed that the benefit of this medication outweighs its risk. Diagnoses and all orders for this visit:    Rheumatoid arthritis involving both hands with positive rheumatoid factor (HCC)  -     hydroxychloroquine (PLAQUENIL) 200 MG Oral Tab; Take 1 tablet (200 mg total) by mouth 2 (two) times daily. Polyarthralgia  -     hydroxychloroquine (PLAQUENIL) 200 MG Oral Tab; Take 1 tablet (200 mg total) by mouth 2 (two) times daily. Vitamin D deficiency  -     ergocalciferol 1.25 MG (25978 UT) Oral Cap; Take 1 capsule (50,000 Units total) by mouth once a week. DDD (degenerative disc disease), lumbar  -     Physical Therapy Referral - External        Patient verbalized understanding of above instructions.  No further questions at this time.     Charu Fields DO  [facility]  10/12/2023    Total time spent: 22 min

## 2023-11-20 ENCOUNTER — APPOINTMENT (OUTPATIENT)
Dept: SLEEP MEDICINE | Age: 73
End: 2023-11-20
Attending: FAMILY MEDICINE

## 2024-01-09 ENCOUNTER — OFFICE VISIT (OUTPATIENT)
Dept: SLEEP MEDICINE | Age: 74
End: 2024-01-09

## 2024-01-09 VITALS
DIASTOLIC BLOOD PRESSURE: 72 MMHG | RESPIRATION RATE: 16 BRPM | HEART RATE: 65 BPM | HEIGHT: 66 IN | BODY MASS INDEX: 39.7 KG/M2 | TEMPERATURE: 97.8 F | SYSTOLIC BLOOD PRESSURE: 123 MMHG | WEIGHT: 247 LBS | OXYGEN SATURATION: 100 %

## 2024-01-09 DIAGNOSIS — G47.33 OSA ON CPAP: Primary | ICD-10-CM

## 2024-01-09 PROCEDURE — 99214 OFFICE O/P EST MOD 30 MIN: CPT | Performed by: FAMILY MEDICINE

## 2024-01-09 PROCEDURE — 99213 OFFICE O/P EST LOW 20 MIN: CPT | Performed by: FAMILY MEDICINE

## 2024-01-09 RX ORDER — HYDROXYCHLOROQUINE SULFATE 200 MG/1
200 TABLET, FILM COATED ORAL DAILY
COMMUNITY

## 2024-01-09 RX ORDER — DULOXETIN HYDROCHLORIDE 20 MG/1
20 CAPSULE, DELAYED RELEASE ORAL DAILY
COMMUNITY

## 2024-05-29 ENCOUNTER — OFFICE VISIT (OUTPATIENT)
Dept: RHEUMATOLOGY | Facility: CLINIC | Age: 74
End: 2024-05-29

## 2024-05-29 VITALS
OXYGEN SATURATION: 98 % | RESPIRATION RATE: 16 BRPM | HEIGHT: 66 IN | SYSTOLIC BLOOD PRESSURE: 120 MMHG | WEIGHT: 251.38 LBS | TEMPERATURE: 98 F | HEART RATE: 63 BPM | DIASTOLIC BLOOD PRESSURE: 64 MMHG | BODY MASS INDEX: 40.4 KG/M2

## 2024-05-29 DIAGNOSIS — M25.562 CHRONIC PAIN OF BOTH KNEES: ICD-10-CM

## 2024-05-29 DIAGNOSIS — M05.741 RHEUMATOID ARTHRITIS INVOLVING BOTH HANDS WITH POSITIVE RHEUMATOID FACTOR (HCC): Primary | ICD-10-CM

## 2024-05-29 DIAGNOSIS — M25.561 CHRONIC PAIN OF BOTH KNEES: ICD-10-CM

## 2024-05-29 DIAGNOSIS — M17.0 PRIMARY OSTEOARTHRITIS OF BOTH KNEES: ICD-10-CM

## 2024-05-29 DIAGNOSIS — M51.36 DDD (DEGENERATIVE DISC DISEASE), LUMBAR: ICD-10-CM

## 2024-05-29 DIAGNOSIS — E55.9 VITAMIN D DEFICIENCY: ICD-10-CM

## 2024-05-29 DIAGNOSIS — Z79.899 LONG-TERM USE OF PLAQUENIL: ICD-10-CM

## 2024-05-29 DIAGNOSIS — M05.742 RHEUMATOID ARTHRITIS INVOLVING BOTH HANDS WITH POSITIVE RHEUMATOID FACTOR (HCC): Primary | ICD-10-CM

## 2024-05-29 DIAGNOSIS — M25.50 POLYARTHRALGIA: ICD-10-CM

## 2024-05-29 DIAGNOSIS — G89.29 CHRONIC PAIN OF BOTH KNEES: ICD-10-CM

## 2024-05-29 PROBLEM — M51.369 DDD (DEGENERATIVE DISC DISEASE), LUMBAR: Status: ACTIVE | Noted: 2024-05-29

## 2024-05-29 PROCEDURE — 99215 OFFICE O/P EST HI 40 MIN: CPT | Performed by: INTERNAL MEDICINE

## 2024-05-29 RX ORDER — HYDROXYCHLOROQUINE SULFATE 200 MG/1
200 TABLET, FILM COATED ORAL 2 TIMES DAILY
Qty: 180 TABLET | Refills: 1 | Status: SHIPPED | OUTPATIENT
Start: 2024-05-29

## 2024-05-29 RX ORDER — OMEPRAZOLE 40 MG/1
40 CAPSULE, DELAYED RELEASE ORAL DAILY
COMMUNITY

## 2024-05-29 NOTE — PROGRESS NOTES
?  RHEUMATOLOGY FOLLOW UP   Date of visit: 05/29/2024  ?  Chief Complaint   Patient presents with    Follow - Up     LOV 10/12/2023 video visit. Pt has pain in both his thumbs and index fingers but states it is not too bad. He has bilateral knee pain that is severe 8 or 9/10 at times. He had gel injections last May and states this helped a lot but now the pain is returning. He can not take NSAIDS due to iron deficiency and therefore takes OTC Tylenol. PT does not help. He also has low back pain when he is walking for long periods of time.      ASSESSMENT, DISCUSSION & PLAN   Assessment:  1. Rheumatoid arthritis involving both hands with positive rheumatoid factor (HCC)    2. DDD (degenerative disc disease), lumbar    3. Long-term use of Plaquenil    4. Vitamin D deficiency    5. Polyarthralgia    6. Chronic pain of both knees    7. Primary osteoarthritis of both knees        Discussion:  Mr. Kerrie Hinson is a 75 yo gentleman who presented for evaluation of joint pain and RF positivity. Unclear details since no records of labs or imaging are available, but we have reached out to both his PCP's office and orthopedics for records to be sent. He has long standing hx of DDD of the lumbar spine as well as OA of the knees. Prior hyalgan injections 3 years ago were helpful. Previously, had a steroid injection which helped but not as much or for as long as the prior gel injection did previously.   Since his initial visit with me, he was confirmed to have RF positivity. Seems like his current symptoms are more related to OA/DDD rather than active RA. He was started on plaquenil but only taking 200mg daily due to some confusion on the instructions. Still has symptoms of De Quervain's tenosynovitis due to + finklestein testing- continue splinting.    At this time, he still lacks signs of active inflammation on his exam and his exam is more consistent with osteoarthritis.   Recommended he increase the plaquenil to BID dosing to  be more weight based.  Continue splinting for the thumb pain.  Okay to try topical voltaren gel prn for the knee, thumb and lower back pain.  Referral placed to pain management for help with his known DDD of the lumbar spine  For the knees, he is interested in getting repeat injections. Will check insurance coverage for gel, steroid and US guided injection and have pt added to schedule for injections.   Reminded of importance of annual eye exams while on the plaquenil, he was recently seen so will request these records.   Otherwise, repeat inflammatory markers and vitamin with next set of labs  Okay to follow up in 6 months or sooner as needed.   Recommended he go forward with GI workup for the iron deficiency.     Patient and pt's daughter in law verbalized understanding of above instructions. No further questions at this time.    Code selection for this visit was based on time spent (40min) on date of service in preparing to see the patient, obtaining and/or reviewing separately obtained history, performing a medically appropriate examination, counseling and educating the patient/family/caregiver, ordering medications or testing, referring and communicating with other healthcare providers, documenting clinical information in the E HR, independently interpreting results and communicating results to the patient/family/caregiver and care coordination with the patient's other providers.    ?  Plan:  Diagnoses and all orders for this visit:    Rheumatoid arthritis involving both hands with positive rheumatoid factor (HCC)  -     Sed Rate, Westergren (Automated) [E]; Future  -     C-Reactive Protein [E]; Future  -     hydroxychloroquine (PLAQUENIL) 200 MG Oral Tab; Take 1 tablet (200 mg total) by mouth 2 (two) times daily.    DDD (degenerative disc disease), lumbar  -     Pain Management Referral - In Network    Long-term use of Plaquenil    Vitamin D deficiency  -     Vitamin D; Future    Polyarthralgia  -      hydroxychloroquine (PLAQUENIL) 200 MG Oral Tab; Take 1 tablet (200 mg total) by mouth 2 (two) times daily.    Chronic pain of both knees    Primary osteoarthritis of both knees      Return in about 6 months (around 11/29/2024).  ?  HPI   Kerrie Johnston is a 74 year old male with the following active problems who is referred for rheumatologic evaluation due to joint pain and RF+.   Pt's daughter in law (also a dentist) presents with him today to help provide history and provide translation per pt request.      Since his last visit, he has been doing okay  Having worsened knee pain. Reminds me had prior injections in the knees in the past- steroids was in May of last year. Felt better with gel injections in the past (approximately 3 years ago).     Continues with lower back pain, worsened with walking for anything more than 10 minutes. Also trouble with standing for prolonged periods. Has not been seen by pain management. Has to use wheelchair often    Still having index/thumb finger pain. Primarily base of the thumb pain.   Did start plaquenil- seems like initially took twice daily and now only taking once daily. Does think his pain has not worsened and feels not as bad as before  Does wearing splinting.     Avoiding NSAIDs due to iron deficiency   Seen by ophthalmology and told no retinal toxicity. Told some changes but not related.       HPI from initial consultation  referred for rheumatologic evaluation due to joint pain and RF+.   Pt's daughter in law (also a dentist) presents with him today to help provide history and provide translation per pt request.      Dx with RA through his old PCP and transitioned care to new PCP recently.   Also has OA in the knees as well as arthritis in the lower back.   For the knees, he has tried PT without much relief. Did get steroids and hyalgan injections which helped. (Last injection was 3 years ago- did series of 5 injections). Last steroid injection was in May prior to  traveling out of the country. Felt like helped slightly for a short while.   Pain in the knees constant. Was taking nabumetone but had to stop due to recent dx of iron deficiency anemia.  Taking tylenol for the pain now which is not helping.     Has pain in the left wrist, worsens at time. Wears a brace to help support. Denies swelling but has worsened pain with carrying things or putting weight/pressure on the wrist.     + morning stiffness in the knees and minimally in the wrist. Also felt in the lower back.   Back pain worsened with bending forward or standing for prolonged periods.   Denies pain improvement with activity, rather gets worse with activity/motions. Has to rest often   + intermittent swelling of the feet/ankles more by the end of the day or if standing/more active   For the lower back, tried PT and injection about 7 years ago which helped (through pain management in [place])   Does sleep with pillow between knees to help with back and knee pain.     Hx of iron def for the past 2 years. Seen GI for EGD/cscope, has not scheduled yet. Thought could be related to NSAID use  Since childhood, has a pain that varies in location in the arms, or leg without any rashes or swelling. Gets better with heat or tylenol/ibuprofen. Lasts anywhere from 30min-2 hours. Infrequent (can happen in 3 months or 6 months), typically when traveling.   Hx of cataract surgery in 2022 b/l.     Denies pain or swelling of the MCPs, pain or swelling of the ankles and bones of the feet, or new skin nodule formation.  The patient denies hair loss, oral or nasal ulcers, photosensitive rash, elevated or scarring rashes, Raynaud's phenomenon, prior renal or liver disease, or history of seizures.  Denies hx of pericarditis or pleuritis.   No history of prior blood clot in the legs or lungs, strokes or ischemic phenomenon.  Denies nonhealing ulcers on the fingertips, trouble swallowing, or severe acid reflux.  The patient denies any  history of uveitis, crampy abdominal pain, constipation, diarrhea, bloody stools, Achilles heel pain or symptoms of enthesitis, psoriatic lesions, spooning or pitting of the nails, history of dactylitis.   There are no symptoms of severe dry eyes, dry mouth, recurrent cavities, or swelling of the cheeks or under the jawbone.  No fevers, chills, lymphadenopathy, unexpected weight loss, easy bruising or bleeding.  Denies chronic sinus infections/disease or epistaxis.  Denies chronic cough or hemoptysis.       Past Medical History:  Past Medical History:    Anxiety    Hyperlipidemia     Past Surgical History:  Past Surgical History:   Procedure Laterality Date    Other surgical history Left     miniscal repair     Family History:  History reviewed. No pertinent family history.  Social History:  Social History     Tobacco Use    Smoking status: Never    Smokeless tobacco: Never   Vaping Use    Vaping status: Never Used   Substance and Sexual Activity    Alcohol use: Never    Drug use: Never     Medications:  Outpatient Medications Marked as Taking for the 5/29/24 encounter (Office Visit) with Nessa Bhardwaj DO   Medication Sig Dispense Refill    DULoxetine 20 MG Oral Cap DR Particles Take 1 capsule (20 mg total) by mouth daily.      pravastatin 40 MG Oral Tab Take 1 tablet (40 mg total) by mouth every evening.      Omeprazole 40 MG Oral Capsule Delayed Release Take 1 capsule (40 mg total) by mouth daily. As needed      NON FORMULARY OTC iron supplement      NON FORMULARY OTC Tylenol as needed for pain      hydroxychloroquine (PLAQUENIL) 200 MG Oral Tab Take 1 tablet (200 mg total) by mouth 2 (two) times daily. 180 tablet 1    metFORMIN 850 MG Oral Tab Take 500 mg by mouth daily.      Nystatin 435588 UNIT/GM External Powder Apply 1 Application topically as needed.      Omega-3 Fatty Acids (FISH OIL) 500 MG Oral Cap Take by mouth As Directed.      folic acid 400 MCG Oral Tab Take 1 tablet (400 mcg total) by mouth daily.       Cyanocobalamin (VITAMIN B 12 OR) Take by mouth.       Modified Medications    Modified Medication Previous Medication    HYDROXYCHLOROQUINE (PLAQUENIL) 200 MG ORAL TAB hydroxychloroquine (PLAQUENIL) 200 MG Oral Tab       Take 1 tablet (200 mg total) by mouth 2 (two) times daily.    Take 1 tablet (200 mg total) by mouth 2 (two) times daily.     Medications Discontinued During This Encounter   Medication Reason    escitalopram 10 MG Oral Tab     escitalopram 20 MG Oral Tab     hydroxychloroquine (PLAQUENIL) 200 MG Oral Tab      ?  ?  Allergies:  Allergies   Allergen Reactions    Amoxicillin-Pot Clavulanate DIARRHEA     ?  REVIEW OF SYSTEMS   ?  Review of Systems   Constitutional:  Negative for chills, fever, malaise/fatigue and weight loss.   HENT:  Negative for congestion, hearing loss, nosebleeds and tinnitus.    Eyes:  Negative for blurred vision, pain and redness.   Respiratory:  Negative for cough, hemoptysis and shortness of breath.    Cardiovascular:  Positive for leg swelling. Negative for chest pain.   Gastrointestinal:  Negative for abdominal pain, blood in stool, constipation, diarrhea and heartburn.   Genitourinary:  Negative for dysuria, frequency, hematuria and urgency.   Musculoskeletal:  Positive for back pain and joint pain. Negative for myalgias and neck pain.   Skin:  Negative for itching and rash.   Neurological:  Positive for weakness (due to pain in the wrist) and headaches (intermittently). Negative for dizziness, tingling and seizures.   Endo/Heme/Allergies:  Negative for environmental allergies. Does not bruise/bleed easily.   Psychiatric/Behavioral:  The patient has insomnia.      PHYSICAL EXAM   Today's Vitals:  Temperature Blood Pressure Heart Rate Resp Rate SpO2   Temp: 97.9 °F (36.6 °C) BP: 120/64 Pulse: 63 Resp: 16 SpO2: 98 %   ?  Current Weight Height BMI BSA Pain   Wt Readings from Last 1 Encounters:   05/29/24 251 lb 6.4 oz (114 kg)    Height: 5' 6\" (167.6 cm) Body mass index is  40.58 kg/m². Body surface area is 2.2 meters squared.         Physical Exam  Vitals and nursing note reviewed.   Constitutional:       General: He is not in acute distress.     Appearance: He is well-developed. He is obese. He is not diaphoretic.   HENT:      Head: Normocephalic.   Eyes:      General: No scleral icterus.     Extraocular Movements: Extraocular movements intact.      Conjunctiva/sclera: Conjunctivae normal.   Neck:      Vascular: No JVD.      Trachea: No tracheal deviation.   Cardiovascular:      Rate and Rhythm: Normal rate and regular rhythm.      Heart sounds: Normal heart sounds. No murmur heard.  Pulmonary:      Effort: Pulmonary effort is normal. No respiratory distress.      Breath sounds: Normal breath sounds. No wheezing.   Musculoskeletal:         General: Tenderness present. No swelling.      Cervical back: Neck supple.      Comments: No swelling, tenderness, redness or restriction of motion of the DIPs, PIPs, MCPs, right wrist, elbows, ankles, or joints of the feet.  Bilateral shoulders with full ROM, no evidence of impingement with provocative maneuvers.  Bilateral knees with medial joint line tenderness, moderate crepitus, no effusion. No patellar tendon tenderness  Left wrist tender proximally, no swelling or mass appreciated. + finkelstein's    Lymphadenopathy:      Cervical: No cervical adenopathy.   Skin:     General: Skin is warm and dry.      Findings: No erythema or rash.      Comments: No malar rash  No periungal erythema  No fingernail pitting/onycholysis    Neurological:      Mental Status: He is alert and oriented to person, place, and time.      Cranial Nerves: No cranial nerve deficit.      Gait: Gait abnormal.   Psychiatric:         Mood and Affect: Mood normal.         Behavior: Behavior normal.     ?  Radiology review:    X-rays SI joint- Unremarkable  X-rays lumbar spine  Partial sacralization L5 noted.  Normal anatomic alignment of the lumbar spine is seen.  The  vertebral body heights are well maintained.  Mild disc space narrowing noted mainly at the L4-L5 level.  Small anterior spurs seen at the L4-L5 level.  Small right lateral spur seen at the L2-L3 and L3-L4 levels.  Moderate facet arthritis noted especially involving the L5-S1 level bilaterally.  Impression multilevel mild to moderate lumbar spondylosis most pronounced involving the lower lumbar facets  Xray wrist received after visit- shows degenerative changes no signs of inflammatory arthritis per report.     Labs:  No results found for: \"WBC\", \"RBC\", \"HGB\", \"HCT\", \"PLT\", \"MPV\", \"MCV\", \"MCH\", \"MCHC\", \"RDW\", \"NEPRELIM\", \"NEUTABS\", \"LYMPHABS\", \"EOSABS\", \"BASABS\", \"NEUT\", \"LYMPH\", \"MON\", \"EOS\", \"BASO\", \"NEPERCENT\", \"LYPERCENT\", \"MOPERCENT\", \"EOPERCENT\", \"BAPERCENT\", \"NE\", \"LYMABS\", \"MOABSO\", \"EOABSO\", \"BAABSO\"  No results found for: \"GLU\", \"BUN\", \"BUNCREA\", \"CREATSERUM\", \"ANIONGAP\", \"GFR\", \"GFRNAA\", \"GFRAA\", \"CA\", \"OSMOCALC\", \"ALKPHO\", \"AST\", \"ALT\", \"ALKPHOS\", \"BILT\", \"TP\", \"ALB\", \"GLOBULIN\", \"AGRATIO\", \"NA\", \"K\", \"CL\", \"CO2\"    Additional Labs:    09/28/2023  CK 51 normal  Uric acid 6.2 normal  Vitamin D 21.9 low  B12 353 normal  CCP negative  CRP 0.4 normal  RF 27.7 positive       Nessa Benton, DO  [facility]  05/29/2024

## 2024-05-31 NOTE — PROGRESS NOTES
Faxed office note to PCP and requested last eye exam and office notes from Eye doctor.  [facility] [phone] and fax number [phone]. This was requested on 5/31/2024.

## 2024-06-18 ENCOUNTER — TELEPHONE (OUTPATIENT)
Dept: RHEUMATOLOGY | Facility: CLINIC | Age: 74
End: 2024-06-18

## 2024-06-18 NOTE — TELEPHONE ENCOUNTER
Pt is scheduled to come in for inj tomorrow at 330 pt's daughter in law states he is currently taking antibiotics and want to know if it is still okay to get injections.

## 2024-06-18 NOTE — TELEPHONE ENCOUNTER
PA Bilateral US guided knee injections with Synvisc one . [place] approved case#[id number]. Auth#[id number] effective 6/13/24-12/10/24.

## 2024-07-26 ENCOUNTER — OFFICE VISIT (OUTPATIENT)
Dept: RHEUMATOLOGY | Facility: CLINIC | Age: 74
End: 2024-07-26
Payer: MEDICAID

## 2024-07-26 VITALS — SYSTOLIC BLOOD PRESSURE: 100 MMHG | DIASTOLIC BLOOD PRESSURE: 50 MMHG

## 2024-07-26 DIAGNOSIS — G89.29 CHRONIC PAIN OF RIGHT KNEE: ICD-10-CM

## 2024-07-26 DIAGNOSIS — M25.561 CHRONIC PAIN OF RIGHT KNEE: ICD-10-CM

## 2024-07-26 DIAGNOSIS — M25.562 CHRONIC PAIN OF LEFT KNEE: ICD-10-CM

## 2024-07-26 DIAGNOSIS — G89.29 CHRONIC PAIN OF LEFT KNEE: ICD-10-CM

## 2024-07-26 DIAGNOSIS — M17.12 PRIMARY OSTEOARTHRITIS OF LEFT KNEE: ICD-10-CM

## 2024-07-26 DIAGNOSIS — M17.11 PRIMARY OSTEOARTHRITIS OF RIGHT KNEE: Primary | ICD-10-CM

## 2024-07-26 PROCEDURE — 20611 DRAIN/INJ JOINT/BURSA W/US: CPT | Performed by: INTERNAL MEDICINE

## 2024-07-26 RX ORDER — TRIAMCINOLONE ACETONIDE 40 MG/ML
40 INJECTION, SUSPENSION INTRA-ARTICULAR; INTRAMUSCULAR ONCE
Status: COMPLETED | OUTPATIENT
Start: 2024-07-26 | End: 2024-07-26

## 2024-07-26 RX ORDER — LIDOCAINE HYDROCHLORIDE 10 MG/ML
6 INJECTION, SOLUTION INFILTRATION; PERINEURAL ONCE
Status: COMPLETED | OUTPATIENT
Start: 2024-07-26 | End: 2024-07-26

## 2024-07-26 RX ADMIN — LIDOCAINE HYDROCHLORIDE 6 ML: 10 INJECTION, SOLUTION INFILTRATION; PERINEURAL at 12:54:00

## 2024-07-26 RX ADMIN — TRIAMCINOLONE ACETONIDE 40 MG: 40 INJECTION, SUSPENSION INTRA-ARTICULAR; INTRAMUSCULAR at 12:55:00

## 2024-07-26 RX ADMIN — TRIAMCINOLONE ACETONIDE 40 MG: 40 INJECTION, SUSPENSION INTRA-ARTICULAR; INTRAMUSCULAR at 12:56:00

## 2024-07-26 RX ADMIN — LIDOCAINE HYDROCHLORIDE 6 ML: 10 INJECTION, SOLUTION INFILTRATION; PERINEURAL at 12:55:00

## 2024-07-26 NOTE — PROCEDURES
Left Knee Procedure Note     Procedure: Ultrasound examination of the left knee.  Equipment: Bluefly II    ultrasound machine.  Indication: Pain in left, difficulty performing blind aspiration/injection. Obesityyes    Findings: Ultrasound examination is performed according to standard EULAR recommendations(1).  Using the 15-6 MHz transducer, as well as color power Doppler settings, real-time imaging of the knee was performed to evaluate the bones, subcutaneous tissues, muscles, tendons and the joint spaces.  Longitudinal and transverse images were obtained over the suprapatellar region as well as medial and lateral joint spaces.     Findings: Spurring medial joint line, small amount of fluid in suprapatellar recess\Impression: Osteoarthritis of the left knee.  Clinical correlation and other imaging studies may be indicated.  Reference: Guidelines for musculoskeletal ultrasound in rheumatology\cb3 Steven PAREKH., Stephany GR, Aldo AARON., Manuela MUHAMMAD., Graham KP, Venkata WA, Glenn RJ, Nery B.; working group for musculoskeletal ultrasound in the EULAR standing committee on international clinical studies including therapeutic trials.\Annals rheumatic disease 2001 July; 60(7): 641-9    Ultrasound guided injection of left knee.  Indication:  Aspiration and injection of knee.  Insertion of needle requires require proper placement into suprapatellar recess.  This is difficult to do  without any sense of accuracy in a blind fashion.  In addition, this is much more difficult if patient has obesity severe arthritis.  In this situation direct visualization by ultrasound allows insertion of  the needle into the proper area without any damage to the cartilage, neuromuscular structures.     Consent was obtained and alternatives as well as potential risks were discussed with the patient prior to procedure. These included  And not limited to ineffectiveness, infection, pain,     The left knee was evaluated using a SonVasonomicste Edge  II ultrasound machine with a 15-6 megahertz transducer.  The suprapatellar recess was identified. This was located on both  longitudinal and transverse views lying beneath the quadriceps tendon and suprapatellar fat pad and superficial to the prefemoral femoral fat.  This was approximately 1-2 cm superior to the patella. After cleansing with hibiclens and alcohol and in a transverse view with an inline approach the suprapatellar recess was entered. This was done under real time imaging.     5 cc of 1% lidocaine was injected followed by Synvisc One along with 40 mg of Kenalog mixed with 1cc lidocaine    There were no complications and followup instructions were given.  No fluid present to aspirate.       Nessa Bhardwaj, DO  [facility]  7/26/2024

## 2024-07-26 NOTE — PROCEDURES
Right Knee Procedure Note     Procedure: Ultrasound examination of the right knee.  Equipment: SonThermal Nomad II    ultrasound machine.  Indication: Pain in right, difficulty performing blind aspiration/injection. Obesityyes    Findings: Ultrasound examination is performed according to standard EULAR recommendations(1).  Using the 13-5 MHz transducer, as well as color power Doppler settings, real-time imaging of the knee was performed to evaluate the bones, subcutaneous tissues, muscles, tendons and the joint spaces.  Longitudinal and transverse images were obtained over the suprapatellar region as well as medial and lateral joint spaces.     Findings: Spurring medial joint line, small amount of fluid in suprapatellar recess\Impression: Osteoarthritis of the right knee.  Clinical correlation and other imaging studies may be indicated.  Reference: Guidelines for musculoskeletal ultrasound in rheumatology\cb3 Steven PAREKH., Stephany GR, Aldo AARON., Manuela MUHAMMAD., Graham KP, Venkata WA, Glenn RJ, Nery B.; working group for musculoskeletal ultrasound in the EULAR standing committee on international clinical studies including therapeutic trials.\Annals rheumatic disease 2001 July; 60(7): 641-9    Ultrasound guided injection of right knee.  Indication:  Aspiration and injection of knee.  Insertion of needle requires require proper placement into suprapatellar recess.  This is difficult to do  without any sense of accuracy in a blind fashion.  In addition, this is much more difficult if patient has obesity severe arthritis.  In this situation direct visualization by ultrasound allows insertion of  the needle into the proper area without any damage to the cartilage, neuromuscular structures.     Consent was obtained and alternatives as well as potential risks were discussed with the patient prior to procedure. These included  And not limited to ineffectiveness, infection, pain,     The right knee was evaluated using a  SonOnGreen Edge II ultrasound machine with a 15-6 megahertz transducer.  The suprapatellar recess was identified. This was located on both  longitudinal and transverse views lying beneath the quadriceps tendon and suprapatellar fat pad and superficial to the prefemoral femoral fat.  This was approximately 1-2 cm superior to the patella. After cleansing with hibiclens and alcohol and in a transverse view with an inline approach the suprapatellar recess was entered. This was done under real time imaging.     5 cc of 1% lidocaine was injected followed by Synvisc One along with 40 mg of Kenalog mixed with 1cc lidocaine    There were no complications and followup instructions were given.  No fluid present to aspirate.       Nessa Bhardwaj DO  [facility]  7/26/2024

## 2024-10-27 DIAGNOSIS — M05.741 RHEUMATOID ARTHRITIS INVOLVING BOTH HANDS WITH POSITIVE RHEUMATOID FACTOR (HCC): ICD-10-CM

## 2024-10-27 DIAGNOSIS — M05.742 RHEUMATOID ARTHRITIS INVOLVING BOTH HANDS WITH POSITIVE RHEUMATOID FACTOR (HCC): ICD-10-CM

## 2024-10-27 DIAGNOSIS — M25.50 POLYARTHRALGIA: ICD-10-CM

## 2024-10-28 RX ORDER — HYDROXYCHLOROQUINE SULFATE 200 MG/1
200 TABLET, FILM COATED ORAL 2 TIMES DAILY
Qty: 180 TABLET | Refills: 1 | Status: SHIPPED | OUTPATIENT
Start: 2024-10-28

## 2024-10-28 NOTE — TELEPHONE ENCOUNTER
Future Appointments   Date Time Provider Department Center   1/6/2025  3:00 PM Nessa Bhardwaj DO [id number] [address]     Last office visit: 5/29/2024    Last fill: 5/29/2024 180 tab, 1 refill

## 2025-01-06 ENCOUNTER — OFFICE VISIT (OUTPATIENT)
Dept: RHEUMATOLOGY | Facility: CLINIC | Age: 75
End: 2025-01-06
Payer: MEDICAID

## 2025-01-06 VITALS
BODY MASS INDEX: 42.59 KG/M2 | SYSTOLIC BLOOD PRESSURE: 138 MMHG | RESPIRATION RATE: 16 BRPM | WEIGHT: 265 LBS | OXYGEN SATURATION: 100 % | TEMPERATURE: 98 F | HEIGHT: 66 IN | DIASTOLIC BLOOD PRESSURE: 80 MMHG | HEART RATE: 62 BPM

## 2025-01-06 DIAGNOSIS — M17.11 PRIMARY OSTEOARTHRITIS OF RIGHT KNEE: ICD-10-CM

## 2025-01-06 DIAGNOSIS — Z79.899 LONG-TERM USE OF PLAQUENIL: ICD-10-CM

## 2025-01-06 DIAGNOSIS — M51.369 DEGENERATION OF INTERVERTEBRAL DISC OF LUMBAR REGION, UNSPECIFIED WHETHER PAIN PRESENT: ICD-10-CM

## 2025-01-06 DIAGNOSIS — M65.4 DE QUERVAIN'S TENOSYNOVITIS, BILATERAL: ICD-10-CM

## 2025-01-06 DIAGNOSIS — M17.12 PRIMARY OSTEOARTHRITIS OF LEFT KNEE: ICD-10-CM

## 2025-01-06 DIAGNOSIS — M05.741 RHEUMATOID ARTHRITIS INVOLVING BOTH HANDS WITH POSITIVE RHEUMATOID FACTOR (HCC): Primary | ICD-10-CM

## 2025-01-06 DIAGNOSIS — M05.742 RHEUMATOID ARTHRITIS INVOLVING BOTH HANDS WITH POSITIVE RHEUMATOID FACTOR (HCC): Primary | ICD-10-CM

## 2025-01-06 DIAGNOSIS — M25.50 POLYARTHRALGIA: ICD-10-CM

## 2025-01-06 PROCEDURE — 99214 OFFICE O/P EST MOD 30 MIN: CPT | Performed by: INTERNAL MEDICINE

## 2025-01-06 RX ORDER — LIDOCAINE 4 G/G
1 PATCH TOPICAL EVERY 24 HOURS
Qty: 30 PATCH | Refills: 3 | Status: SHIPPED | OUTPATIENT
Start: 2025-01-06

## 2025-01-06 RX ORDER — DICLOFENAC SODIUM 30 MG/G
1 GEL TOPICAL 3 TIMES DAILY PRN
Qty: 100 G | Refills: 2 | Status: SHIPPED | OUTPATIENT
Start: 2025-01-06

## 2025-01-06 NOTE — PROGRESS NOTES
?  RHEUMATOLOGY FOLLOW UP   Date of visit: 01/06/2025  ?  Chief Complaint   Patient presents with    Rheumatoid Arthritis     7 month f/u. Still having knee pain. Bilateral wrist and thumb pain as well. Converted rapid score of 8.7     ASSESSMENT, DISCUSSION & PLAN   Assessment:  1. Rheumatoid arthritis involving both hands with positive rheumatoid factor (HCC)    2. Polyarthralgia    3. Degeneration of intervertebral disc of lumbar region, unspecified whether pain present    4. Long-term use of Plaquenil    5. Primary osteoarthritis of right knee    6. Primary osteoarthritis of left knee    7. De Quervain's tenosynovitis, bilateral          Discussion:  Mr. Kerrie Hinson is a 73 yo gentleman who presented for evaluation of joint pain and RF positivity. Unclear details since no records of labs or imaging are available, but we have reached out to both his PCP's office and orthopedics for records to be sent. He has long standing hx of DDD of the lumbar spine as well as OA of the knees. Prior hyalgan injections 3 years ago were helpful. Previously, had a steroid injection which helped but not as much or for as long as the prior gel injection did previously.   Since his initial visit with me, he was confirmed to have RF positivity. Seems like his current symptoms are more related to OA/DDD rather than active RA. He was started on plaquenil and initially only taking 200mg daily due to some confusion on the instructions. He increased to BID dosing, tolerating and feels like helping with pain overall  Still having issues with De Quervain's tenosynovitis due to + finklestein testing- continue splinting and now interested in injection.     B/l knee injections helped in July, interested in repeat injections, will need to be end of jan/feb for it to be 6m difference.     At this time, he still lacks signs of active inflammation on his exam and his exam is more consistent with osteoarthritis.   Recommended he continue the  plaquenil at BID dosing to be more weight based.  Continue splinting for the thumb pain.  Okay to try topical voltaren gel prn or lidocaine patches for the knee, thumb and lower back pain. Rx sent for both.   Previously placed referral placed to pain management for help with his known DDD of the lumbar spine     Reminded of importance of annual eye exams while on the plaquenil, he was recently seen so will request these records.   Otherwise, repeat inflammatory markers - these were ordered at last visit and reprinted for them.   Okay to follow up in 6 months or sooner as needed.   Previously recommended he go forward with GI workup for the iron deficiency.     Patient and pt's daughter in law verbalized understanding of above instructions. No further questions at this time.    Code selection for this visit was based on time spent (30min) on date of service in preparing to see the patient, obtaining and/or reviewing separately obtained history, performing a medically appropriate examination, counseling and educating the patient/family/caregiver, ordering medications or testing, referring and communicating with other healthcare providers, documenting clinical information in the E HR, independently interpreting results and communicating results to the patient/family/caregiver and care coordination with the patient's other providers.    ?  Plan:  Diagnoses and all orders for this visit:    Rheumatoid arthritis involving both hands with positive rheumatoid factor (HCC)    Polyarthralgia  -     lidocaine (HM LIDOCAINE PATCH) 4 % External Patch; Place 1 patch onto the skin daily.  -     Diclofenac Sodium 3 % External Gel; Apply 1 inch topically 3 (three) times daily as needed.    Degeneration of intervertebral disc of lumbar region, unspecified whether pain present  -     lidocaine (HM LIDOCAINE PATCH) 4 % External Patch; Place 1 patch onto the skin daily.  -     Diclofenac Sodium 3 % External Gel; Apply 1 inch topically 3  (three) times daily as needed.    Long-term use of Plaquenil    Primary osteoarthritis of right knee  -     lidocaine (HM LIDOCAINE PATCH) 4 % External Patch; Place 1 patch onto the skin daily.  -     Diclofenac Sodium 3 % External Gel; Apply 1 inch topically 3 (three) times daily as needed.    Primary osteoarthritis of left knee  -     lidocaine (HM LIDOCAINE PATCH) 4 % External Patch; Place 1 patch onto the skin daily.  -     Diclofenac Sodium 3 % External Gel; Apply 1 inch topically 3 (three) times daily as needed.    De Quervain's tenosynovitis, bilateral        Return in about 6 months (around 7/6/2025).  ?  HPI   Kerrie Johnston is a 74 year old male with the following active problems who is referred for rheumatologic evaluation due to joint pain and RF+.   Pt's daughter in law (also a dentist) presents with him today to help provide history and provide translation per pt request.  Denied formal translation services.     Since his last visit, he has been doing okay.  Having worsened pain in the knees again. Had injections in July which helped but after 3 months, felt pain got worse. Since that time progressively gotten worse.   Has been taking hcq 200mg twice daily.   Previously felt some relief with taking one tablet but feels better taking two tablets.   Is on duloxetine 20mg daily for anxiety and pain.     Has not tried diclofenac/voltaren gel recently. Using other topicals as needed.   Has tried lidocaine patches but does not provide much relief.     Avoiding NSAIDs due to iron deficiency   Seen by ophthalmology and told no retinal toxicity. Told some changes but not related. (Last done in Sept/Oct). Through [facility].   Considering seeing optometry.       HPI from initial consultation  referred for rheumatologic evaluation due to joint pain and RF+.   Pt's daughter in law (also a dentist) presents with him today to help provide history and provide translation per pt request.      Dx with RA  through his old PCP and transitioned care to new PCP recently.   Also has OA in the knees as well as arthritis in the lower back.   For the knees, he has tried PT without much relief. Did get steroids and hyalgan injections which helped. (Last injection was 3 years ago- did series of 5 injections). Last steroid injection was in May prior to traveling out of the country. Felt like helped slightly for a short while.   Pain in the knees constant. Was taking nabumetone but had to stop due to recent dx of iron deficiency anemia.  Taking tylenol for the pain now which is not helping.     Has pain in the left wrist, worsens at time. Wears a brace to help support. Denies swelling but has worsened pain with carrying things or putting weight/pressure on the wrist.     + morning stiffness in the knees and minimally in the wrist. Also felt in the lower back.   Back pain worsened with bending forward or standing for prolonged periods.   Denies pain improvement with activity, rather gets worse with activity/motions. Has to rest often   + intermittent swelling of the feet/ankles more by the end of the day or if standing/more active   For the lower back, tried PT and injection about 7 years ago which helped (through pain management in [place])   Does sleep with pillow between knees to help with back and knee pain.     Hx of iron def for the past 2 years. Seen GI for EGD/cscope, has not scheduled yet. Thought could be related to NSAID use  Since childhood, has a pain that varies in location in the arms, or leg without any rashes or swelling. Gets better with heat or tylenol/ibuprofen. Lasts anywhere from 30min-2 hours. Infrequent (can happen in 3 months or 6 months), typically when traveling.   Hx of cataract surgery in 2022 b/l.     Denies pain or swelling of the MCPs, pain or swelling of the ankles and bones of the feet, or new skin nodule formation.  The patient denies hair loss, oral or nasal ulcers, photosensitive rash,  elevated or scarring rashes, Raynaud's phenomenon, prior renal or liver disease, or history of seizures.  Denies hx of pericarditis or pleuritis.   No history of prior blood clot in the legs or lungs, strokes or ischemic phenomenon.  Denies nonhealing ulcers on the fingertips, trouble swallowing, or severe acid reflux.  The patient denies any history of uveitis, crampy abdominal pain, constipation, diarrhea, bloody stools, Achilles heel pain or symptoms of enthesitis, psoriatic lesions, spooning or pitting of the nails, history of dactylitis.   There are no symptoms of severe dry eyes, dry mouth, recurrent cavities, or swelling of the cheeks or under the jawbone.  No fevers, chills, lymphadenopathy, unexpected weight loss, easy bruising or bleeding.  Denies chronic sinus infections/disease or epistaxis.  Denies chronic cough or hemoptysis.       Past Medical History:  Past Medical History:    Anxiety    Hyperlipidemia     Past Surgical History:  Past Surgical History:   Procedure Laterality Date    Other surgical history Left     miniscal repair     Family History:  History reviewed. No pertinent family history.  Social History:  Social History     Tobacco Use    Smoking status: Never    Smokeless tobacco: Never   Vaping Use    Vaping status: Never Used   Substance and Sexual Activity    Alcohol use: Never    Drug use: Never     Social Drivers of Health      Received from [facility]     Medications:  Outpatient Medications Marked as Taking for the 1/6/25 encounter (Office Visit) with Nessa Bhardwaj, DO   Medication Sig Dispense Refill    Ferrous Sulfate (IRON OR) Take by mouth.      metFORMIN 500 MG Oral Tab Take 1 tablet (500 mg total) by mouth daily.      Coenzyme Q10 (EQL COQ10 OR) Take by mouth.      lidocaine (HM LIDOCAINE PATCH) 4 % External Patch Place 1 patch onto the skin daily. 30 patch 3    Diclofenac Sodium 3 % External Gel Apply 1 inch topically 3 (three) times daily as needed. 100 g 2     hydroxychloroquine (PLAQUENIL) 200 MG Oral Tab Take 1 tablet (200 mg total) by mouth 2 (two) times daily. 180 tablet 1    DULoxetine 20 MG Oral Cap DR Particles Take 1 capsule (20 mg total) by mouth daily.      pravastatin 40 MG Oral Tab Take 1 tablet (40 mg total) by mouth every evening.      Omeprazole 40 MG Oral Capsule Delayed Release Take 1 capsule (40 mg total) by mouth daily. As needed      NON FORMULARY OTC Tylenol as needed for pain      Nystatin 803105 UNIT/GM External Powder Apply 1 Application topically as needed.      Omega-3 Fatty Acids (FISH OIL) 500 MG Oral Cap Take by mouth As Directed.      folic acid 400 MCG Oral Tab Take 1 tablet (400 mcg total) by mouth daily.      Cyanocobalamin (VITAMIN B 12 OR) Take by mouth.       Modified Medications    No medications on file     Medications Discontinued During This Encounter   Medication Reason    metFORMIN 850 MG Oral Tab     NON FORMULARY      ?  ?  Allergies:  Allergies   Allergen Reactions    Amoxicillin-Pot Clavulanate DIARRHEA     ?  REVIEW OF SYSTEMS   ?  Review of Systems   Constitutional:  Negative for chills, fever, malaise/fatigue and weight loss.   HENT:  Negative for congestion, hearing loss, nosebleeds and tinnitus.    Eyes:  Negative for blurred vision, pain and redness.   Respiratory:  Negative for cough, hemoptysis and shortness of breath.    Cardiovascular:  Positive for leg swelling. Negative for chest pain.   Gastrointestinal:  Negative for abdominal pain, blood in stool, constipation, diarrhea and heartburn.   Genitourinary:  Negative for dysuria, frequency, hematuria and urgency.   Musculoskeletal:  Positive for back pain and joint pain. Negative for myalgias and neck pain.   Skin:  Negative for itching and rash.   Neurological:  Positive for weakness (due to pain in the wrist) and headaches (intermittently). Negative for dizziness, tingling and seizures.   Endo/Heme/Allergies:  Negative for environmental allergies. Does not  bruise/bleed easily.   Psychiatric/Behavioral:  The patient has insomnia.      PHYSICAL EXAM   Today's Vitals:  Temperature Blood Pressure Heart Rate Resp Rate SpO2   Temp: 97.6 °F (36.4 °C) BP: 138/80 Pulse: 62 Resp: 16 SpO2: 100 %   ?  Current Weight Height BMI BSA Pain   Wt Readings from Last 1 Encounters:   01/06/25 265 lb (120.2 kg)    Height: 5' 6\" (167.6 cm) Body mass index is 42.77 kg/m². Body surface area is 2.25 meters squared.         Physical Exam  Vitals and nursing note reviewed.   Constitutional:       General: He is not in acute distress.     Appearance: He is well-developed. He is obese. He is not diaphoretic.   HENT:      Head: Normocephalic.   Eyes:      General: No scleral icterus.     Extraocular Movements: Extraocular movements intact.      Conjunctiva/sclera: Conjunctivae normal.   Neck:      Vascular: No JVD.      Trachea: No tracheal deviation.   Cardiovascular:      Rate and Rhythm: Normal rate and regular rhythm.      Heart sounds: Normal heart sounds. No murmur heard.  Pulmonary:      Effort: Pulmonary effort is normal. No respiratory distress.      Breath sounds: Normal breath sounds. No wheezing.   Musculoskeletal:         General: Tenderness present. No swelling.      Cervical back: Neck supple.      Comments: No swelling, tenderness, redness or restriction of motion of the DIPs, PIPs, MCPs, right wrist, elbows, ankles, or joints of the feet.  Bilateral shoulders with full ROM, no evidence of impingement with provocative maneuvers.  Bilateral knees with medial joint line tenderness, moderate crepitus, no effusion. No patellar tendon tenderness  Left wrist tender proximally, no swelling or mass appreciated. + finkelstein's bilaterally right with trace swelling   Lymphadenopathy:      Cervical: No cervical adenopathy.   Skin:     General: Skin is warm and dry.      Findings: No erythema or rash.      Comments: No malar rash  No periungal erythema  No fingernail pitting/onycholysis     Neurological:      Mental Status: He is alert and oriented to person, place, and time.      Cranial Nerves: No cranial nerve deficit.      Gait: Gait abnormal.   Psychiatric:         Mood and Affect: Mood normal.         Behavior: Behavior normal.     ?  Radiology review:    X-rays SI joint- Unremarkable  X-rays lumbar spine  Partial sacralization L5 noted.  Normal anatomic alignment of the lumbar spine is seen.  The vertebral body heights are well maintained.  Mild disc space narrowing noted mainly at the L4-L5 level.  Small anterior spurs seen at the L4-L5 level.  Small right lateral spur seen at the L2-L3 and L3-L4 levels.  Moderate facet arthritis noted especially involving the L5-S1 level bilaterally.  Impression multilevel mild to moderate lumbar spondylosis most pronounced involving the lower lumbar facets  Xray wrist received after visit- shows degenerative changes no signs of inflammatory arthritis per report.     Labs:  No results found for: \"WBC\", \"RBC\", \"HGB\", \"HCT\", \"PLT\", \"MPV\", \"MCV\", \"MCH\", \"MCHC\", \"RDW\", \"NEPRELIM\", \"NEUTABS\", \"LYMPHABS\", \"EOSABS\", \"BASABS\", \"NEUT\", \"LYMPH\", \"MON\", \"EOS\", \"BASO\", \"NEPERCENT\", \"LYPERCENT\", \"MOPERCENT\", \"EOPERCENT\", \"BAPERCENT\", \"NE\", \"LYMABS\", \"MOABSO\", \"EOABSO\", \"BAABSO\"  No results found for: \"GLU\", \"BUN\", \"BUNCREA\", \"CREATSERUM\", \"ANIONGAP\", \"GFR\", \"GFRNAA\", \"GFRAA\", \"CA\", \"OSMOCALC\", \"ALKPHO\", \"AST\", \"ALT\", \"ALKPHOS\", \"BILT\", \"TP\", \"ALB\", \"GLOBULIN\", \"AGRATIO\", \"NA\", \"K\", \"CL\", \"CO2\"    Additional Labs:    09/28/2023  CK 51 normal  Uric acid 6.2 normal  Vitamin D 21.9 low  B12 353 normal  CCP negative  CRP 0.4 normal  RF 27.7 positive       Nessa Bhardwaj,   [facility]  01/06/2025

## 2025-01-09 ENCOUNTER — LAB ENCOUNTER (OUTPATIENT)
Dept: LAB | Age: 75
End: 2025-01-09
Attending: INTERNAL MEDICINE
Payer: MEDICAID

## 2025-01-09 DIAGNOSIS — M05.741 RHEUMATOID ARTHRITIS INVOLVING BOTH HANDS WITH POSITIVE RHEUMATOID FACTOR (HCC): ICD-10-CM

## 2025-01-09 DIAGNOSIS — E55.9 VITAMIN D DEFICIENCY: ICD-10-CM

## 2025-01-09 DIAGNOSIS — M05.742 RHEUMATOID ARTHRITIS INVOLVING BOTH HANDS WITH POSITIVE RHEUMATOID FACTOR (HCC): ICD-10-CM

## 2025-01-09 LAB
CRP SERPL-MCNC: <0.4 MG/DL (ref ?–0.5)
ERYTHROCYTE [SEDIMENTATION RATE] IN BLOOD: 33 MM/HR
VIT D+METAB SERPL-MCNC: 25.7 NG/ML (ref 30–100)

## 2025-01-09 PROCEDURE — 36415 COLL VENOUS BLD VENIPUNCTURE: CPT

## 2025-01-09 PROCEDURE — 82306 VITAMIN D 25 HYDROXY: CPT

## 2025-01-09 PROCEDURE — 86140 C-REACTIVE PROTEIN: CPT

## 2025-01-09 PROCEDURE — 85652 RBC SED RATE AUTOMATED: CPT

## 2025-01-10 DIAGNOSIS — E55.9 VITAMIN D DEFICIENCY: Primary | ICD-10-CM

## 2025-01-10 RX ORDER — ERGOCALCIFEROL 1.25 MG/1
50000 CAPSULE, LIQUID FILLED ORAL WEEKLY
Qty: 12 CAPSULE | Refills: 0 | Status: SHIPPED | OUTPATIENT
Start: 2025-01-10

## 2025-01-10 NOTE — TELEPHONE ENCOUNTER
----- Message from Nessa Bhardwaj sent at 1/10/2025  8:19 AM CST -----  Please let pt know that vit d low, needs rx for once weekly [id number] x 12 weeks.  Repeat labs when completing rx to see if further rx needed or OTC sufficient.   Also, has slight elevation in the sed rate but CRP normal. I dont have a prior sed rate to compare to. We'll continue to monitor. And remind that he's on cancellation list for the thumb injection and knees for Feb.     Nessa Bhardwaj, DO  [facility]  1/10/2025

## 2025-01-14 ENCOUNTER — APPOINTMENT (OUTPATIENT)
Dept: SLEEP MEDICINE | Age: 75
End: 2025-01-14

## 2025-01-20 ENCOUNTER — OFFICE VISIT (OUTPATIENT)
Dept: SLEEP MEDICINE | Age: 75
End: 2025-01-20

## 2025-01-20 VITALS
RESPIRATION RATE: 16 BRPM | DIASTOLIC BLOOD PRESSURE: 71 MMHG | BODY MASS INDEX: 39.7 KG/M2 | OXYGEN SATURATION: 98 % | WEIGHT: 247 LBS | SYSTOLIC BLOOD PRESSURE: 118 MMHG | HEIGHT: 66 IN | HEART RATE: 75 BPM

## 2025-01-20 DIAGNOSIS — G47.33 OSA (OBSTRUCTIVE SLEEP APNEA): Primary | ICD-10-CM

## 2025-01-20 PROCEDURE — 99213 OFFICE O/P EST LOW 20 MIN: CPT | Performed by: FAMILY MEDICINE

## 2025-02-05 ENCOUNTER — PATIENT MESSAGE (OUTPATIENT)
Dept: RHEUMATOLOGY | Facility: CLINIC | Age: 75
End: 2025-02-05

## 2025-02-06 NOTE — TELEPHONE ENCOUNTER
PA for Synvisc One () bilateral knee started with [facility]. Determination within 72hrs. They will reach out to our office for additional information if required.

## 2025-02-06 NOTE — TELEPHONE ENCOUNTER
Phoned Pardeep at [phone] for PA Synvisc one in office. PA approved. Case# [id number] effective 2/6/25-8/5/25.     Order placed through [employer]

## 2025-02-18 ENCOUNTER — HOSPITAL ENCOUNTER (OUTPATIENT)
Dept: GENERAL RADIOLOGY | Age: 75
Discharge: HOME | End: 2025-02-18
Attending: INTERNAL MEDICINE
Payer: MEDICAID

## 2025-02-18 ENCOUNTER — OFFICE VISIT (OUTPATIENT)
Dept: RHEUMATOLOGY | Facility: CLINIC | Age: 75
End: 2025-02-18
Payer: MEDICAID

## 2025-02-18 VITALS — TEMPERATURE: 97 F | DIASTOLIC BLOOD PRESSURE: 62 MMHG | SYSTOLIC BLOOD PRESSURE: 136 MMHG

## 2025-02-18 DIAGNOSIS — G89.29 CHRONIC PAIN OF RIGHT KNEE: ICD-10-CM

## 2025-02-18 DIAGNOSIS — M05.742 RHEUMATOID ARTHRITIS INVOLVING BOTH HANDS WITH POSITIVE RHEUMATOID FACTOR (HCC): ICD-10-CM

## 2025-02-18 DIAGNOSIS — M17.12 PRIMARY OSTEOARTHRITIS OF LEFT KNEE: ICD-10-CM

## 2025-02-18 DIAGNOSIS — G89.29 CHRONIC PAIN OF BOTH SHOULDERS: ICD-10-CM

## 2025-02-18 DIAGNOSIS — M05.741 RHEUMATOID ARTHRITIS INVOLVING BOTH HANDS WITH POSITIVE RHEUMATOID FACTOR (HCC): ICD-10-CM

## 2025-02-18 DIAGNOSIS — M25.512 CHRONIC PAIN OF BOTH SHOULDERS: ICD-10-CM

## 2025-02-18 DIAGNOSIS — M25.511 CHRONIC PAIN OF BOTH SHOULDERS: ICD-10-CM

## 2025-02-18 DIAGNOSIS — M17.11 PRIMARY OSTEOARTHRITIS OF RIGHT KNEE: ICD-10-CM

## 2025-02-18 DIAGNOSIS — M25.562 CHRONIC PAIN OF LEFT KNEE: Primary | ICD-10-CM

## 2025-02-18 DIAGNOSIS — G89.29 CHRONIC PAIN OF LEFT KNEE: Primary | ICD-10-CM

## 2025-02-18 DIAGNOSIS — M25.561 CHRONIC PAIN OF RIGHT KNEE: ICD-10-CM

## 2025-02-18 PROCEDURE — 20611 DRAIN/INJ JOINT/BURSA W/US: CPT | Performed by: INTERNAL MEDICINE

## 2025-02-18 PROCEDURE — 73030 X-RAY EXAM OF SHOULDER: CPT | Performed by: INTERNAL MEDICINE

## 2025-02-18 RX ORDER — LIDOCAINE HYDROCHLORIDE 10 MG/ML
6 INJECTION, SOLUTION INFILTRATION; PERINEURAL ONCE
Status: COMPLETED | OUTPATIENT
Start: 2025-02-18 | End: 2025-02-18

## 2025-02-18 RX ORDER — TRIAMCINOLONE ACETONIDE 40 MG/ML
40 INJECTION, SUSPENSION INTRA-ARTICULAR; INTRAMUSCULAR ONCE
Status: COMPLETED | OUTPATIENT
Start: 2025-02-18 | End: 2025-02-18

## 2025-02-18 RX ORDER — IBUPROFEN 200 MG
200 TABLET ORAL EVERY 6 HOURS PRN
COMMUNITY

## 2025-02-18 RX ADMIN — TRIAMCINOLONE ACETONIDE 40 MG: 40 INJECTION, SUSPENSION INTRA-ARTICULAR; INTRAMUSCULAR at 13:28:00

## 2025-02-18 RX ADMIN — LIDOCAINE HYDROCHLORIDE 6 ML: 10 INJECTION, SOLUTION INFILTRATION; PERINEURAL at 13:19:00

## 2025-02-18 RX ADMIN — TRIAMCINOLONE ACETONIDE 40 MG: 40 INJECTION, SUSPENSION INTRA-ARTICULAR; INTRAMUSCULAR at 13:29:00

## 2025-02-18 RX ADMIN — LIDOCAINE HYDROCHLORIDE 6 ML: 10 INJECTION, SOLUTION INFILTRATION; PERINEURAL at 13:18:00

## 2025-02-18 NOTE — PROCEDURES
Left Knee Procedure Note     Procedure: Ultrasound examination of the left knee.  Equipment: AUTOFACT II    ultrasound machine.  Indication: Pain in left, difficulty performing blind aspiration/injection. Obesityyes    Findings: Ultrasound examination is performed according to standard EULAR recommendations(1).  Using the 15-6 MHz transducer, as well as color power Doppler settings, real-time imaging of the knee was performed to evaluate the bones, subcutaneous tissues, muscles, tendons and the joint spaces.  Longitudinal and transverse images were obtained over the suprapatellar region as well as medial and lateral joint spaces.     Findings: Spurring medial joint line, small amount of fluid in suprapatellar recess\Impression: Osteoarthritis of the left knee.  Clinical correlation and other imaging studies may be indicated.  Reference: Guidelines for musculoskeletal ultrasound in rheumatology\cb3 Steven PAREKH., Stephany GR, Aldo AARON., Manuela MUHAMMAD., Graham KP, Venkata WA, Glenn RJ, Nery B.; working group for musculoskeletal ultrasound in the EULAR standing committee on international clinical studies including therapeutic trials.\Annals rheumatic disease 2001 July; 60(7): 641-9    Ultrasound guided injection of left knee.  Indication:  Aspiration and injection of knee.  Insertion of needle requires require proper placement into suprapatellar recess.  This is difficult to do  without any sense of accuracy in a blind fashion.  In addition, this is much more difficult if patient has obesity severe arthritis.  In this situation direct visualization by ultrasound allows insertion of  the needle into the proper area without any damage to the cartilage, neuromuscular structures.     Consent was obtained and alternatives as well as potential risks were discussed with the patient prior to procedure. These included  And not limited to ineffectiveness, infection, pain,     The left knee was evaluated using a SonCirroSecurete Edge  II ultrasound machine with a 15-6 megahertz transducer.  The suprapatellar recess was identified. This was located on both  longitudinal and transverse views lying beneath the quadriceps tendon and suprapatellar fat pad and superficial to the prefemoral femoral fat.  This was approximately 1-2 cm superior to the patella. After cleansing with hibiclens and alcohol and in a transverse view with an inline approach the suprapatellar recess was entered. This was done under real time imaging.     5 cc of 1% lidocaine was injected followed by Synvisc One along with 40 mg of Kenalog mixed with 1cc lidocaine    There were no complications and followup instructions were given.  No fluid present to aspirate.       Nessa Bhardwaj DO  [facility]  02/18/2025

## 2025-02-18 NOTE — PROCEDURES
Right Knee Procedure Note     Procedure: Ultrasound examination of the right knee.  Equipment: [employer] II    ultrasound machine.  Indication: Pain in right, difficulty performing blind aspiration/injection. Obesityyes    Findings: Ultrasound examination is performed according to standard EULAR recommendations(1).  Using the 13-5 MHz transducer, as well as color power Doppler settings, real-time imaging of the knee was performed to evaluate the bones, subcutaneous tissues, muscles, tendons and the joint spaces.  Longitudinal and transverse images were obtained over the suprapatellar region as well as medial and lateral joint spaces.     Findings: Spurring medial joint line, small amount of fluid in suprapatellar recess\Impression: Osteoarthritis of the right knee.  Clinical correlation and other imaging studies may be indicated.  Reference: Guidelines for musculoskeletal ultrasound in rheumatology\cb3 Steven PAREKH., Stephany GR, Aldo AARON., Manuela MUHAMMAD., Graham KP, Venkata WA, Glenn RJ, Nery B.; working group for musculoskeletal ultrasound in the EULAR standing committee on international clinical studies including therapeutic trials.\Annals rheumatic disease 2001 July; 60(7): 641-9    Ultrasound guided injection of right knee.  Indication:  Aspiration and injection of knee.  Insertion of needle requires require proper placement into suprapatellar recess.  This is difficult to do  without any sense of accuracy in a blind fashion.  In addition, this is much more difficult if patient has obesity severe arthritis.  In this situation direct visualization by ultrasound allows insertion of  the needle into the proper area without any damage to the cartilage, neuromuscular structures.     Consent was obtained and alternatives as well as potential risks were discussed with the patient prior to procedure. These included  And not limited to ineffectiveness, infection, pain,     The right knee was evaluated using a  [employer] II ultrasound machine with a 15-6 megahertz transducer.  The suprapatellar recess was identified. This was located on both  longitudinal and transverse views lying beneath the quadriceps tendon and suprapatellar fat pad and superficial to the prefemoral femoral fat.  This was approximately 1-2 cm superior to the patella. After cleansing with hibiclens and alcohol and in a transverse view with an inline approach the suprapatellar recess was entered. This was done under real time imaging.     5 cc of 1% lidocaine was injected followed by Synvisc One along with 40 mg of Kenalog mixed with 1cc lidocaine    There were no complications and followup instructions were given.  No fluid present to aspirate.     Of note, pt still interested in [facility] injections and now asking about shoulder injections. Will get updated shoulder xrays first.     Nessa Bhardwaj DO  [facility]  02/18/2025

## 2025-02-25 ENCOUNTER — OFFICE VISIT (OUTPATIENT)
Dept: RHEUMATOLOGY | Facility: CLINIC | Age: 75
End: 2025-02-25
Payer: MEDICAID

## 2025-02-25 VITALS — TEMPERATURE: 97 F | SYSTOLIC BLOOD PRESSURE: 140 MMHG | DIASTOLIC BLOOD PRESSURE: 62 MMHG

## 2025-02-25 DIAGNOSIS — G89.29 CHRONIC PAIN OF LEFT THUMB: ICD-10-CM

## 2025-02-25 DIAGNOSIS — M79.645 CHRONIC PAIN OF LEFT THUMB: ICD-10-CM

## 2025-02-25 DIAGNOSIS — M18.12 ARTHRITIS OF CARPOMETACARPAL (CMC) JOINT OF LEFT THUMB: ICD-10-CM

## 2025-02-25 DIAGNOSIS — M79.644 CHRONIC PAIN OF RIGHT THUMB: Primary | ICD-10-CM

## 2025-02-25 DIAGNOSIS — G89.29 CHRONIC PAIN OF RIGHT THUMB: Primary | ICD-10-CM

## 2025-02-25 DIAGNOSIS — M18.11 ARTHRITIS OF CARPOMETACARPAL (CMC) JOINT OF RIGHT THUMB: ICD-10-CM

## 2025-02-25 PROCEDURE — 20604 DRAIN/INJ JOINT/BURSA W/US: CPT | Performed by: INTERNAL MEDICINE

## 2025-02-25 RX ORDER — LIDOCAINE HYDROCHLORIDE 10 MG/ML
0.5 INJECTION, SOLUTION INFILTRATION; PERINEURAL ONCE
Status: COMPLETED | OUTPATIENT
Start: 2025-02-25 | End: 2025-02-25

## 2025-02-25 RX ORDER — METHYLPREDNISOLONE ACETATE 40 MG/ML
20 INJECTION, SUSPENSION INTRA-ARTICULAR; INTRALESIONAL; INTRAMUSCULAR; SOFT TISSUE ONCE
Status: COMPLETED | OUTPATIENT
Start: 2025-02-25 | End: 2025-02-25

## 2025-02-25 RX ADMIN — METHYLPREDNISOLONE ACETATE 20 MG: 40 INJECTION, SUSPENSION INTRA-ARTICULAR; INTRALESIONAL; INTRAMUSCULAR; SOFT TISSUE at 15:37:00

## 2025-02-25 RX ADMIN — METHYLPREDNISOLONE ACETATE 20 MG: 40 INJECTION, SUSPENSION INTRA-ARTICULAR; INTRALESIONAL; INTRAMUSCULAR; SOFT TISSUE at 15:38:00

## 2025-02-25 RX ADMIN — LIDOCAINE HYDROCHLORIDE 0.5 ML: 10 INJECTION, SOLUTION INFILTRATION; PERINEURAL at 15:36:00

## 2025-02-25 RX ADMIN — LIDOCAINE HYDROCHLORIDE 0.5 ML: 10 INJECTION, SOLUTION INFILTRATION; PERINEURAL at 15:37:00

## 2025-02-25 NOTE — PROCEDURES
[facility] Procedure Note  Left    Indication: Persistent pain despite conservative care.  Ultrasound indication: Proper needle placement and to avoid neurovascular structures .    Prior to procedure potential complications were discussed including but not limited to bleeding, and effectiveness, infection, nerve damage and pain. After consent and discussing the pros and cons a [employer] ultrasound machine was used. A 13-5 MHz probe was used to evaluate the carpometacarpal joint of the left thumb. Views in the longitudinal and transverse views were obtained along with Doppler our ultrasound to identify any vascular structures. The carpometacarpal joint was identified with the base of the thumb metacarpal between the trapezium. Using sterile procedures with sterile gel after cleansing with Hibiclens and alcohol the joint was entered and out of plan approach under direct visualization with the ultrasound. 0.5cc 1% lidocaine mixed with 20mg of Depo-Medrol were injected into the joint. There were no complications. Post procedure followup instructions were given.    Nessa Bhardwaj DO  [facility]  02/25/2025

## 2025-04-26 ENCOUNTER — LAB ENCOUNTER (OUTPATIENT)
Dept: LAB | Age: 75
End: 2025-04-26
Attending: FAMILY MEDICINE
Payer: MEDICAID

## 2025-04-26 DIAGNOSIS — D50.0 BLOOD LOSS ANEMIA: ICD-10-CM

## 2025-04-26 DIAGNOSIS — R73.03 BORDERLINE DIABETES: ICD-10-CM

## 2025-04-26 DIAGNOSIS — E78.5 HYPERLIPIDEMIA: ICD-10-CM

## 2025-04-26 DIAGNOSIS — Z00.00 ROUTINE GENERAL MEDICAL EXAMINATION AT A HEALTH CARE FACILITY: Primary | ICD-10-CM

## 2025-04-26 DIAGNOSIS — E55.9 VITAMIN D DEFICIENCY: ICD-10-CM

## 2025-04-26 DIAGNOSIS — Z13.29 SCREENING FOR THYROID DISORDER: ICD-10-CM

## 2025-04-26 LAB
ALBUMIN SERPL-MCNC: 4.2 G/DL (ref 3.2–4.8)
ALBUMIN/GLOB SERPL: 1.4 {RATIO} (ref 1–2)
ALP LIVER SERPL-CCNC: 58 U/L (ref 45–117)
ALT SERPL-CCNC: 21 U/L (ref 10–49)
ANION GAP SERPL CALC-SCNC: 3 MMOL/L (ref 0–18)
AST SERPL-CCNC: 21 U/L (ref ?–34)
BASOPHILS # BLD AUTO: 0.04 X10(3) UL (ref 0–0.2)
BASOPHILS NFR BLD AUTO: 0.5 %
BILIRUB SERPL-MCNC: 0.7 MG/DL (ref 0.2–1.1)
BUN BLD-MCNC: 16 MG/DL (ref 9–23)
CALCIUM BLD-MCNC: 9.6 MG/DL (ref 8.7–10.6)
CHLORIDE SERPL-SCNC: 105 MMOL/L (ref 98–112)
CHOLEST SERPL-MCNC: 197 MG/DL (ref ?–200)
CO2 SERPL-SCNC: 28 MMOL/L (ref 21–32)
CREAT BLD-MCNC: 1.08 MG/DL (ref 0.7–1.3)
EGFRCR SERPLBLD CKD-EPI 2021: 72 ML/MIN/1.73M2 (ref 60–?)
EOSINOPHIL # BLD AUTO: 0.23 X10(3) UL (ref 0–0.7)
EOSINOPHIL NFR BLD AUTO: 3 %
ERYTHROCYTE [DISTWIDTH] IN BLOOD BY AUTOMATED COUNT: 15.3 %
EST. AVERAGE GLUCOSE BLD GHB EST-MCNC: 131 MG/DL (ref 68–126)
FASTING PATIENT LIPID ANSWER: YES
FASTING STATUS PATIENT QL REPORTED: YES
GLOBULIN PLAS-MCNC: 2.9 G/DL (ref 2–3.5)
GLUCOSE BLD-MCNC: 99 MG/DL (ref 70–99)
HBA1C MFR BLD: 6.2 % (ref ?–5.7)
HCT VFR BLD AUTO: 39.3 % (ref 39–53)
HDLC SERPL-MCNC: 58 MG/DL (ref 40–59)
HGB BLD-MCNC: 13 G/DL (ref 13–17.5)
IMM GRANULOCYTES # BLD AUTO: 0.05 X10(3) UL (ref 0–1)
IMM GRANULOCYTES NFR BLD: 0.6 %
LDLC SERPL CALC-MCNC: 122 MG/DL (ref ?–100)
LYMPHOCYTES # BLD AUTO: 2.1 X10(3) UL (ref 1–4)
LYMPHOCYTES NFR BLD AUTO: 27 %
MCH RBC QN AUTO: 27.1 PG (ref 26–34)
MCHC RBC AUTO-ENTMCNC: 33.1 G/DL (ref 31–37)
MCV RBC AUTO: 81.9 FL (ref 80–100)
MONOCYTES # BLD AUTO: 0.84 X10(3) UL (ref 0.1–1)
MONOCYTES NFR BLD AUTO: 10.8 %
NEUTROPHILS # BLD AUTO: 4.52 X10 (3) UL (ref 1.5–7.7)
NEUTROPHILS # BLD AUTO: 4.52 X10(3) UL (ref 1.5–7.7)
NEUTROPHILS NFR BLD AUTO: 58.1 %
NONHDLC SERPL-MCNC: 139 MG/DL (ref ?–130)
OSMOLALITY SERPL CALC.SUM OF ELEC: 283 MOSM/KG (ref 275–295)
PLATELET # BLD AUTO: 231 10(3)UL (ref 150–450)
POTASSIUM SERPL-SCNC: 4.2 MMOL/L (ref 3.5–5.1)
PROT SERPL-MCNC: 7.1 G/DL (ref 5.7–8.2)
RBC # BLD AUTO: 4.8 X10(6)UL (ref 3.8–5.8)
SODIUM SERPL-SCNC: 136 MMOL/L (ref 136–145)
TRIGL SERPL-MCNC: 97 MG/DL (ref 30–149)
TSI SER-ACNC: 0.78 UIU/ML (ref 0.55–4.78)
VIT D+METAB SERPL-MCNC: 38.9 NG/ML (ref 30–100)
VLDLC SERPL CALC-MCNC: 17 MG/DL (ref 0–30)
WBC # BLD AUTO: 7.8 X10(3) UL (ref 4–11)

## 2025-04-26 PROCEDURE — 84443 ASSAY THYROID STIM HORMONE: CPT

## 2025-04-26 PROCEDURE — 80053 COMPREHEN METABOLIC PANEL: CPT

## 2025-04-26 PROCEDURE — 80061 LIPID PANEL: CPT

## 2025-04-26 PROCEDURE — 85025 COMPLETE CBC W/AUTO DIFF WBC: CPT

## 2025-04-26 PROCEDURE — 36415 COLL VENOUS BLD VENIPUNCTURE: CPT

## 2025-04-26 PROCEDURE — 82306 VITAMIN D 25 HYDROXY: CPT

## 2025-04-26 PROCEDURE — 83036 HEMOGLOBIN GLYCOSYLATED A1C: CPT

## 2025-08-06 ENCOUNTER — OFFICE VISIT (OUTPATIENT)
Dept: RHEUMATOLOGY | Facility: CLINIC | Age: 75
End: 2025-08-06

## 2025-08-06 VITALS
OXYGEN SATURATION: 98 % | SYSTOLIC BLOOD PRESSURE: 108 MMHG | TEMPERATURE: 98 F | HEART RATE: 100 BPM | DIASTOLIC BLOOD PRESSURE: 54 MMHG | BODY MASS INDEX: 43 KG/M2 | RESPIRATION RATE: 16 BRPM | WEIGHT: 268.81 LBS

## 2025-08-06 DIAGNOSIS — M05.741 RHEUMATOID ARTHRITIS INVOLVING BOTH HANDS WITH POSITIVE RHEUMATOID FACTOR (HCC): Primary | ICD-10-CM

## 2025-08-06 DIAGNOSIS — M25.50 POLYARTHRALGIA: ICD-10-CM

## 2025-08-06 DIAGNOSIS — M51.369 DEGENERATION OF INTERVERTEBRAL DISC OF LUMBAR REGION, UNSPECIFIED WHETHER PAIN PRESENT: ICD-10-CM

## 2025-08-06 DIAGNOSIS — G89.29 CHRONIC PAIN OF BOTH KNEES: ICD-10-CM

## 2025-08-06 DIAGNOSIS — M25.562 CHRONIC PAIN OF BOTH KNEES: ICD-10-CM

## 2025-08-06 DIAGNOSIS — M17.0 PRIMARY OSTEOARTHRITIS OF BOTH KNEES: ICD-10-CM

## 2025-08-06 DIAGNOSIS — M25.561 CHRONIC PAIN OF BOTH KNEES: ICD-10-CM

## 2025-08-06 DIAGNOSIS — M05.742 RHEUMATOID ARTHRITIS INVOLVING BOTH HANDS WITH POSITIVE RHEUMATOID FACTOR (HCC): Primary | ICD-10-CM

## 2025-08-06 DIAGNOSIS — Z79.899 LONG-TERM USE OF PLAQUENIL: ICD-10-CM

## 2025-08-06 PROCEDURE — 99213 OFFICE O/P EST LOW 20 MIN: CPT | Performed by: INTERNAL MEDICINE

## 2025-08-06 RX ORDER — HYDROXYCHLOROQUINE SULFATE 200 MG/1
200 TABLET, FILM COATED ORAL 2 TIMES DAILY
Qty: 180 TABLET | Refills: 1 | Status: SHIPPED | OUTPATIENT
Start: 2025-08-06

## 2025-08-20 ENCOUNTER — LAB ENCOUNTER (OUTPATIENT)
Dept: LAB | Age: 75
End: 2025-08-20
Attending: INTERNAL MEDICINE

## 2025-08-20 ENCOUNTER — HOSPITAL ENCOUNTER (OUTPATIENT)
Dept: GENERAL RADIOLOGY | Age: 75
Discharge: HOME | End: 2025-08-20
Attending: INTERNAL MEDICINE

## 2025-08-20 DIAGNOSIS — M25.561 CHRONIC PAIN OF BOTH KNEES: ICD-10-CM

## 2025-08-20 DIAGNOSIS — G89.29 CHRONIC PAIN OF BOTH KNEES: ICD-10-CM

## 2025-08-20 DIAGNOSIS — M17.0 PRIMARY OSTEOARTHRITIS OF BOTH KNEES: ICD-10-CM

## 2025-08-20 DIAGNOSIS — M05.741 RHEUMATOID ARTHRITIS INVOLVING BOTH HANDS WITH POSITIVE RHEUMATOID FACTOR (HCC): ICD-10-CM

## 2025-08-20 DIAGNOSIS — M25.562 CHRONIC PAIN OF BOTH KNEES: ICD-10-CM

## 2025-08-20 DIAGNOSIS — M05.742 RHEUMATOID ARTHRITIS INVOLVING BOTH HANDS WITH POSITIVE RHEUMATOID FACTOR (HCC): ICD-10-CM

## 2025-08-20 LAB
CRP SERPL-MCNC: <0.5 MG/DL (ref ?–0.5)
ERYTHROCYTE [SEDIMENTATION RATE] IN BLOOD: 13 MM/HR (ref 0–20)
URATE SERPL-MCNC: 6.2 MG/DL (ref 3.7–9.2)

## 2025-08-20 PROCEDURE — 73562 X-RAY EXAM OF KNEE 3: CPT | Performed by: INTERNAL MEDICINE

## 2025-08-20 PROCEDURE — 84550 ASSAY OF BLOOD/URIC ACID: CPT

## 2025-08-20 PROCEDURE — 85652 RBC SED RATE AUTOMATED: CPT

## 2025-08-20 PROCEDURE — 73560 X-RAY EXAM OF KNEE 1 OR 2: CPT | Performed by: INTERNAL MEDICINE

## 2025-08-20 PROCEDURE — 86140 C-REACTIVE PROTEIN: CPT

## 2025-08-20 PROCEDURE — 36415 COLL VENOUS BLD VENIPUNCTURE: CPT

## 2025-08-21 ENCOUNTER — TELEPHONE (OUTPATIENT)
Dept: RHEUMATOLOGY | Facility: CLINIC | Age: 75
End: 2025-08-21

## (undated) NOTE — Clinical Note
Pt would like two injections - left thumb de quervain's US guided injection- can be added to cancellation list - b/l knee synvisc one US guided. Had in on 07/26 so would have to be after 01/27; okay for both at one visit.   Nessa Bhardwaj, DO [facility] 1/7/2025

## (undated) NOTE — Clinical Note
Please fax copy of note to PCP.  Also, please call optometry/ophthalmology per request today.   Nessa Bhardwaj,  [facility] 5/29/2024

## (undated) NOTE — Clinical Note
Please check insurance coverage for US guided gel/steroid injection.   Nessa Bhardwaj, DO [facility] 5/29/2024